# Patient Record
Sex: FEMALE | Race: WHITE | NOT HISPANIC OR LATINO | URBAN - METROPOLITAN AREA
[De-identification: names, ages, dates, MRNs, and addresses within clinical notes are randomized per-mention and may not be internally consistent; named-entity substitution may affect disease eponyms.]

---

## 2017-01-01 ENCOUNTER — HOME CARE/HOSPICE - HEALTHEAST (OUTPATIENT)
Dept: HOSPICE | Facility: HOSPICE | Age: 82
End: 2017-01-01

## 2017-01-01 ENCOUNTER — COMMUNICATION - HEALTHEAST (OUTPATIENT)
Dept: INTERNAL MEDICINE | Facility: CLINIC | Age: 82
End: 2017-01-01

## 2017-01-01 ENCOUNTER — COMMUNICATION - HEALTHEAST (OUTPATIENT)
Dept: ONCOLOGY | Facility: CLINIC | Age: 82
End: 2017-01-01

## 2017-01-01 ENCOUNTER — OFFICE VISIT - HEALTHEAST (OUTPATIENT)
Dept: INTERNAL MEDICINE | Facility: CLINIC | Age: 82
End: 2017-01-01

## 2017-01-01 ENCOUNTER — HOME CARE/HOSPICE - HEALTHEAST (OUTPATIENT)
Dept: HOME HEALTH SERVICES | Facility: HOME HEALTH | Age: 82
End: 2017-01-01

## 2017-01-01 ENCOUNTER — RECORDS - HEALTHEAST (OUTPATIENT)
Dept: ADMINISTRATIVE | Facility: OTHER | Age: 82
End: 2017-01-01

## 2017-01-01 ENCOUNTER — AMBULATORY - HEALTHEAST (OUTPATIENT)
Dept: GERIATRICS | Facility: CLINIC | Age: 82
End: 2017-01-01

## 2017-01-01 ENCOUNTER — HOSPITAL ENCOUNTER (OUTPATIENT)
Dept: ULTRASOUND IMAGING | Facility: CLINIC | Age: 82
Discharge: HOME | End: 2017-07-20
Attending: UROLOGY

## 2017-01-01 ENCOUNTER — AMBULATORY - HEALTHEAST (OUTPATIENT)
Dept: ONCOLOGY | Facility: CLINIC | Age: 82
End: 2017-01-01

## 2017-01-01 ENCOUNTER — OFFICE VISIT - HEALTHEAST (OUTPATIENT)
Dept: GERIATRICS | Facility: CLINIC | Age: 82
End: 2017-01-01

## 2017-01-01 ENCOUNTER — AMBULATORY - HEALTHEAST (OUTPATIENT)
Dept: HOSPICE | Facility: HOSPICE | Age: 82
End: 2017-01-01

## 2017-01-01 ENCOUNTER — HOSPITAL ENCOUNTER (OUTPATIENT)
Dept: RADIOLOGY | Facility: CLINIC | Age: 82
Discharge: HOME | End: 2017-11-17
Attending: SURGERY

## 2017-01-01 ENCOUNTER — COMMUNICATION - HEALTHEAST (OUTPATIENT)
Dept: HOME HEALTH SERVICES | Facility: HOME HEALTH | Age: 82
End: 2017-01-01

## 2017-01-01 ENCOUNTER — OFFICE VISIT - HEALTHEAST (OUTPATIENT)
Dept: CARDIOLOGY | Facility: CLINIC | Age: 82
End: 2017-01-01

## 2017-01-01 ENCOUNTER — AMBULATORY - HEALTHEAST (OUTPATIENT)
Dept: LAB | Facility: CLINIC | Age: 82
End: 2017-01-01

## 2017-01-01 ENCOUNTER — HOSPITAL ENCOUNTER (OUTPATIENT)
Dept: PET IMAGING | Facility: HOSPITAL | Age: 82
Discharge: HOME | End: 2017-11-03
Attending: INTERNAL MEDICINE

## 2017-01-01 ENCOUNTER — HOSPITAL ENCOUNTER (OUTPATIENT)
Dept: ULTRASOUND IMAGING | Facility: CLINIC | Age: 82
Discharge: HOME | End: 2017-08-28

## 2017-01-01 ENCOUNTER — COMMUNICATION - HEALTHEAST (OUTPATIENT)
Dept: SCHEDULING | Facility: CLINIC | Age: 82
End: 2017-01-01

## 2017-01-01 ENCOUNTER — COMMUNICATION - HEALTHEAST (OUTPATIENT)
Dept: ENDOCRINOLOGY | Facility: CLINIC | Age: 82
End: 2017-01-01

## 2017-01-01 ENCOUNTER — HOSPITAL ENCOUNTER (OUTPATIENT)
Dept: LAB | Age: 82
Setting detail: SPECIMEN
Discharge: HOME | End: 2017-04-20

## 2017-01-01 ENCOUNTER — OFFICE VISIT - HEALTHEAST (OUTPATIENT)
Dept: ONCOLOGY | Facility: CLINIC | Age: 82
End: 2017-01-01

## 2017-01-01 ENCOUNTER — RECORDS - HEALTHEAST (OUTPATIENT)
Dept: BONE DENSITY | Facility: CLINIC | Age: 82
End: 2017-01-01

## 2017-01-01 ENCOUNTER — COMMUNICATION - HEALTHEAST (OUTPATIENT)
Dept: PULMONOLOGY | Facility: OTHER | Age: 82
End: 2017-01-01

## 2017-01-01 ENCOUNTER — AMBULATORY - HEALTHEAST (OUTPATIENT)
Dept: INTENSIVE CARE | Facility: CLINIC | Age: 82
End: 2017-01-01

## 2017-01-01 DIAGNOSIS — J96.01 ACUTE RESPIRATORY FAILURE WITH HYPOXEMIA (H): ICD-10-CM

## 2017-01-01 DIAGNOSIS — C34.91 ADENOCARCINOMA OF LUNG, STAGE 4, RIGHT (H): ICD-10-CM

## 2017-01-01 DIAGNOSIS — J43.8 OTHER EMPHYSEMA (H): ICD-10-CM

## 2017-01-01 DIAGNOSIS — R33.9 URINARY RETENTION: ICD-10-CM

## 2017-01-01 DIAGNOSIS — R09.02 HYPOXIA: ICD-10-CM

## 2017-01-01 DIAGNOSIS — I05.9 MITRAL VALVE DISORDER: ICD-10-CM

## 2017-01-01 DIAGNOSIS — I50.32 CHRONIC DIASTOLIC HEART FAILURE (H): ICD-10-CM

## 2017-01-01 DIAGNOSIS — R91.1 LUNG NODULE: ICD-10-CM

## 2017-01-01 DIAGNOSIS — I31.39 EFFUSION, PERICARDIUM: ICD-10-CM

## 2017-01-01 DIAGNOSIS — M79.89 ARM SWELLING: ICD-10-CM

## 2017-01-01 DIAGNOSIS — I10 ESSENTIAL HYPERTENSION: ICD-10-CM

## 2017-01-01 DIAGNOSIS — K21.9 GERD (GASTROESOPHAGEAL REFLUX DISEASE): ICD-10-CM

## 2017-01-01 DIAGNOSIS — J90 PLEURAL EFFUSION: ICD-10-CM

## 2017-01-01 DIAGNOSIS — J91.0 MALIGNANT PLEURAL EFFUSION (H): ICD-10-CM

## 2017-01-01 DIAGNOSIS — M81.0 AGE-RELATED OSTEOPOROSIS WITHOUT CURRENT PATHOLOGICAL FRACTURE: ICD-10-CM

## 2017-01-01 DIAGNOSIS — I10 ESSENTIAL HYPERTENSION WITH GOAL BLOOD PRESSURE LESS THAN 140/90: ICD-10-CM

## 2017-01-01 DIAGNOSIS — I50.9 CHF (CONGESTIVE HEART FAILURE) (H): ICD-10-CM

## 2017-01-01 DIAGNOSIS — R35.0 FREQUENCY OF URINATION: ICD-10-CM

## 2017-01-01 DIAGNOSIS — I10 HTN (HYPERTENSION): ICD-10-CM

## 2017-01-01 DIAGNOSIS — R33.9 INCOMPLETE BLADDER EMPTYING: ICD-10-CM

## 2017-01-01 DIAGNOSIS — M81.0 SENILE OSTEOPOROSIS: ICD-10-CM

## 2017-01-01 DIAGNOSIS — I35.9 AORTIC VALVE DISORDER: ICD-10-CM

## 2017-01-01 DIAGNOSIS — R06.02 SHORTNESS OF BREATH: ICD-10-CM

## 2017-01-01 DIAGNOSIS — J43.9 EMPHYSEMA LUNG (H): ICD-10-CM

## 2017-01-01 DIAGNOSIS — G47.00 INSOMNIA: ICD-10-CM

## 2017-01-01 DIAGNOSIS — J91.0 PLEURAL EFFUSION, MALIGNANT (H): ICD-10-CM

## 2017-01-01 DIAGNOSIS — S80.811A ABRASION OF RIGHT LEG: ICD-10-CM

## 2017-01-01 DIAGNOSIS — R60.0 ARM EDEMA: ICD-10-CM

## 2017-01-01 DIAGNOSIS — T81.82XA: ICD-10-CM

## 2017-01-01 DIAGNOSIS — R30.0 DYSURIA: ICD-10-CM

## 2017-01-01 DIAGNOSIS — M25.471: ICD-10-CM

## 2017-01-01 DIAGNOSIS — Z23 NEED FOR PROPHYLACTIC VACCINATION AND INOCULATION AGAINST INFLUENZA: ICD-10-CM

## 2017-01-01 DIAGNOSIS — J44.9 CHRONIC OBSTRUCTIVE PULMONARY DISEASE, UNSPECIFIED COPD TYPE (H): ICD-10-CM

## 2017-01-01 DIAGNOSIS — I10 HYPERTENSION: ICD-10-CM

## 2017-01-01 DIAGNOSIS — R10.9 ABDOMINAL PAIN: ICD-10-CM

## 2017-01-01 DIAGNOSIS — R06.09 DYSPNEA ON EXERTION: ICD-10-CM

## 2017-01-01 DIAGNOSIS — I49.5 TACHYCARDIA-BRADYCARDIA SYNDROME (H): ICD-10-CM

## 2017-01-01 DIAGNOSIS — I10 ESSENTIAL HYPERTENSION WITH GOAL BLOOD PRESSURE LESS THAN 130/80: ICD-10-CM

## 2017-01-01 DIAGNOSIS — I89.0 LYMPHEDEMA: ICD-10-CM

## 2017-01-01 DIAGNOSIS — N39.0 RECURRENT UTI: ICD-10-CM

## 2017-01-01 LAB
ATRIAL RATE - MUSE: 82 BPM
DIASTOLIC BLOOD PRESSURE - MUSE: NORMAL MMHG
INTERPRETATION ECG - MUSE: NORMAL
P AXIS - MUSE: 82 DEGREES
PR INTERVAL - MUSE: 176 MS
QRS DURATION - MUSE: 84 MS
QT - MUSE: 376 MS
QTC - MUSE: 439 MS
R AXIS - MUSE: -49 DEGREES
SYSTOLIC BLOOD PRESSURE - MUSE: NORMAL MMHG
T AXIS - MUSE: 66 DEGREES
VENTRICULAR RATE- MUSE: 82 BPM

## 2017-01-01 RX ORDER — IPRATROPIUM BROMIDE AND ALBUTEROL SULFATE 2.5; .5 MG/3ML; MG/3ML
3 SOLUTION RESPIRATORY (INHALATION) 4 TIMES DAILY
Status: SHIPPED | COMMUNITY
Start: 2017-01-01

## 2017-01-01 RX ORDER — BISACODYL 10 MG
10 SUPPOSITORY, RECTAL RECTAL DAILY PRN
Status: SHIPPED | COMMUNITY
Start: 2017-01-01

## 2017-01-01 RX ORDER — HYDROMORPHONE HYDROCHLORIDE 1 MG/ML
1 SOLUTION ORAL 3 TIMES DAILY
Status: SHIPPED | COMMUNITY
Start: 2017-01-01

## 2017-01-01 RX ORDER — HYDROMORPHONE HYDROCHLORIDE 1 MG/ML
1 SOLUTION ORAL
Status: SHIPPED | COMMUNITY
Start: 2017-01-01

## 2017-01-01 RX ORDER — LORAZEPAM 0.5 MG/1
0.5-2 TABLET ORAL EVERY 12 HOURS PRN
Status: SHIPPED | COMMUNITY
Start: 2017-01-01

## 2017-01-01 RX ORDER — IPRATROPIUM BROMIDE AND ALBUTEROL SULFATE 2.5; .5 MG/3ML; MG/3ML
3 SOLUTION RESPIRATORY (INHALATION) EVERY 6 HOURS PRN
Qty: 25 VIAL | Refills: 2 | Status: SHIPPED | OUTPATIENT
Start: 2017-01-01 | End: 2018-10-13

## 2017-01-01 RX ORDER — FUROSEMIDE 20 MG
40 TABLET ORAL SEE ADMIN INSTRUCTIONS
Status: SHIPPED | COMMUNITY
Start: 2017-01-01 | End: 2018-01-01

## 2017-01-01 RX ORDER — ATROPINE SULFATE 10 MG/ML
1-2 SOLUTION/ DROPS OPHTHALMIC EVERY 4 HOURS PRN
Status: SHIPPED | COMMUNITY
Start: 2017-01-01

## 2017-01-01 RX ORDER — AMOXICILLIN 250 MG
1 CAPSULE ORAL 2 TIMES DAILY
Status: SHIPPED | COMMUNITY
Start: 2017-01-01

## 2017-01-01 RX ORDER — HALOPERIDOL 2 MG/ML
0.5-2 SOLUTION ORAL EVERY 4 HOURS PRN
Status: SHIPPED | COMMUNITY
Start: 2017-01-01

## 2017-01-01 RX ORDER — ALBUTEROL SULFATE 90 UG/1
1-2 AEROSOL, METERED RESPIRATORY (INHALATION) EVERY 4 HOURS PRN
Qty: 1 INHALER | Refills: 0 | Status: SHIPPED | OUTPATIENT
Start: 2017-01-01

## 2017-01-01 RX ORDER — PREDNISONE 10 MG/1
10 TABLET ORAL DAILY
Status: SHIPPED | COMMUNITY
Start: 2017-01-01

## 2017-01-01 ASSESSMENT — MIFFLIN-ST. JEOR
SCORE: 797.31
SCORE: 863.09
SCORE: 806.38

## 2021-05-30 ENCOUNTER — RECORDS - HEALTHEAST (OUTPATIENT)
Dept: ADMINISTRATIVE | Facility: CLINIC | Age: 86
End: 2021-05-30

## 2021-05-30 VITALS — BODY MASS INDEX: 19.72 KG/M2 | WEIGHT: 111.3 LBS

## 2021-05-30 VITALS — WEIGHT: 110 LBS | HEIGHT: 63 IN | BODY MASS INDEX: 19.49 KG/M2

## 2021-05-31 VITALS — WEIGHT: 103.7 LBS | BODY MASS INDEX: 18.37 KG/M2

## 2021-05-31 VITALS — BODY MASS INDEX: 18.6 KG/M2 | WEIGHT: 101.7 LBS

## 2021-05-31 VITALS — WEIGHT: 99 LBS | BODY MASS INDEX: 18.22 KG/M2 | HEIGHT: 62 IN

## 2021-05-31 VITALS — BODY MASS INDEX: 19.1 KG/M2 | WEIGHT: 107.8 LBS

## 2021-05-31 VITALS — WEIGHT: 99 LBS | BODY MASS INDEX: 18.11 KG/M2

## 2021-05-31 VITALS — BODY MASS INDEX: 18.11 KG/M2 | WEIGHT: 99 LBS

## 2021-05-31 VITALS — BODY MASS INDEX: 18.25 KG/M2 | WEIGHT: 103 LBS

## 2021-05-31 VITALS — WEIGHT: 107 LBS | BODY MASS INDEX: 18.95 KG/M2

## 2021-05-31 VITALS — WEIGHT: 106.7 LBS | BODY MASS INDEX: 19.52 KG/M2

## 2021-05-31 VITALS — BODY MASS INDEX: 18.58 KG/M2 | WEIGHT: 101 LBS | HEIGHT: 62 IN

## 2021-05-31 VITALS — BODY MASS INDEX: 18.66 KG/M2 | WEIGHT: 102 LBS

## 2021-06-01 ENCOUNTER — RECORDS - HEALTHEAST (OUTPATIENT)
Dept: ADMINISTRATIVE | Facility: CLINIC | Age: 86
End: 2021-06-01

## 2021-06-05 ENCOUNTER — RECORDS - HEALTHEAST (OUTPATIENT)
Dept: INTERVENTIONAL RADIOLOGY/VASCULAR | Facility: HOSPITAL | Age: 86
End: 2021-06-05

## 2021-06-05 ENCOUNTER — RECORDS - HEALTHEAST (OUTPATIENT)
Dept: SCHEDULING | Facility: CLINIC | Age: 86
End: 2021-06-05

## 2021-06-05 DIAGNOSIS — M81.0 OSTEOPOROSIS: ICD-10-CM

## 2021-06-05 DIAGNOSIS — M54.50 LOW BACK PAIN: ICD-10-CM

## 2021-06-05 DIAGNOSIS — M51.9: ICD-10-CM

## 2021-06-05 DIAGNOSIS — T14.8XXA CLOSED FRACTURE OF BONE: ICD-10-CM

## 2021-06-09 NOTE — PROGRESS NOTES
1. Osteoporosis Senile  DXA Bone Density Scan       Return in about 6 months (around 8/24/2017) for Recheck.    Patient Instructions   Prolia 2nd today.  Prolia 3rd in 6 months.  DXA in August 2017.   Phone number to schedule [phone].  Please avoid any extensive dental work as implants and teeth extractions for the next 1-2 months.  Daily calcium need is 5928-7282 mg a day from the diet and supplements.  Calcium citrate is easier to digest.  Vitamin D 2000 IU daily recommended.      Chief Complaint   Patient presents with     Osteoporosis Follow Up     Joint Swelling       Visit Vitals     /70     Pulse 66     Wt 111 lb 4.8 oz (50.5 kg)     BMI 19.72 kg/m2         Did you experience any problems with previous Prolia injection? no  Any medication change in the last 6 months? no  Did you take prednisone or other immunosupressant drugs in the last 6 months   (chemo, transplant, rheum, dermatology conditions)? no  Did you have any serious infection in the last 6 months?no  Any recent hospitalizations?no  Do you plan any dental work in the next 2-3 months?no  How much calcium do you take daily from the diet and supplements?1200 mg  How much vit D do you take daily? 2000 IU  Last DXA? 8/2016      Patient is here today for the 2nd Prolia injection. Patient tolerated previous injections well.   We discussed calcium and vit D daily needs today.   Next Prolia injection will be in 6 months.     15 minutes spent with the patient and more then 50 % of the time in counseling.  This note has been dictated using voice recognition software. Any grammatical or context distortions are unintentional and inherent to the software      Patient Active Problem List   Diagnosis     Aortic Regurgitation     Diastolic Dysfunction     Hyperlipidemia     Mitral Regurgitation     Emphysema     Hypertension     Osteoporosis Senile     GERD (gastroesophageal reflux disease)     CHF (congestive heart failure)       Current Outpatient  Prescriptions on File Prior to Visit   Medication Sig Dispense Refill     acetaminophen (TYLENOL) 325 MG tablet Take 650 mg by mouth every 6 (six) hours as needed for pain.       albuterol (PROAIR HFA) 90 mcg/actuation inhaler Inhale 1-2 puffs every 4 (four) hours as needed. Every 4-6 hours 1 Inhaler 6     aspirin 325 MG EC tablet Take 325 mg by mouth daily as needed.        bisacodyl (DULCOLAX) 10 mg suppository Insert 10 mg into the rectum daily as needed.       calcium carbonate (OS-NICKIE) 600 mg (1,500 mg) tablet Take 600 mg by mouth 2 (two) times a day with meals.       CARTIA  mg 24 hr capsule TAKE 1 CAPSULE DAILY 90 capsule 1     cholecalciferol, vitamin D3, 1,000 unit tablet Take 2,000 Units by mouth daily.       diltiazem (CARTIA XT) 240 MG 24 hr capsule Take 240 mg by mouth every evening.        fluticasone-salmeterol (ADVAIR DISKUS) 500-50 mcg/dose DISKUS USE 1 INHALATION TWO TIMES A DAY 3 each 2     furosemide (LASIX) 20 MG tablet TAKE 1 TABLET DAILY 90 tablet 3     ibuprofen (ADVIL,MOTRIN) 200 MG tablet Take 200 mg by mouth every 6 (six) hours as needed for pain.       ipratropium-albuterol (DUO-NEB) 0.5-2.5 mg/3 mL nebulizer Take 3 mL by nebulization every 6 (six) hours as needed. 25 vial 2     losartan (COZAAR) 100 MG tablet Take 1 tablet (100 mg total) by mouth every evening. 90 tablet 3     magnesium oxide (MAGOX) 400 mg tablet Take 1 tablet (400 mg total) by mouth daily. 200 tablet 1     melatonin 3 mg Tab tablet Take 3 mg by mouth bedtime as needed.       multivitamin (MULTIVITAMIN) per tablet Take 1 tablet by mouth daily.       nebulizer and compressor Blanca duonebs 1 each 0     ranitidine (ZANTAC) 150 MG tablet Take 1 tablet (150 mg total) by mouth 2 (two) times a day. 60 tablet 1     senna-docusate (PERICOLACE) 8.6-50 mg tablet Take 1 tablet by mouth bedtime as needed for constipation.       VIT A/VIT C/VIT E/ZINC/COPPER (ICAPS AREDS ORAL) Take 2 capsules by mouth daily.        cloNIDine HCl  (CATAPRES) 0.2 MG tablet Take 1 tablet (0.2 mg total) by mouth daily. 90 tablet 3     No current facility-administered medications on file prior to visit.

## 2021-06-10 NOTE — PROGRESS NOTES
"ASSESSMENT:    Cystitis    PLAN:  Urine culture will be run.  Cipro 500 twice daily for 10 days.  Follow-up as needed.  Problem List Items Addressed This Visit     None      Visit Diagnoses     Frequency of urination    -  Primary    Relevant Orders    Urinalysis-UC if Indicated          There are no discontinued medications.    No Follow-up on file.    There are no Patient Instructions on file for this visit.    CHIEF COMPLAINT:  Chief Complaint   Patient presents with     Urinary Tract Infection     possible UTI  - cloudy urine, abdominal pain, frequency at night time.        HISTORY OF PRESENT ILLNESS:  Susu Pinedo is a 89 y.o. female presenting to the clinic today with a possible UTI. Her present symptoms include increased urgency and frequency of urination at night, lower abdominal pain, and cloudy urine. She clarifies that she wakes for nocturia with high urgency frequently throughout the night but is unable to void significant amounts of urine each time, some times not passing any urine whatsoever. She denies experiencing increased frequency or urgency of urination during the day. Her urinalysis is positive for a small amount of leukocytes and a moderate amount of bacteria.     REVIEW OF SYSTEMS:   She denies fevers or hematuria. She is feeling well physically outside of her present urinary symptoms. All other systems are negative.    PFSH:  Allergies   Allergen Reactions     Amlodipine Besylate      Hydrochlorothiazide      Annotation: caused low sodium       Other Allergy (See Comments)      Contrast Media Ready-Box MISC, 04/10/2012.       Penicillins Hives       TOBACCO USE:  History   Smoking Status     Former Smoker     Quit date: 7/11/1965   Smokeless Tobacco     Not on file       VITALS:  Vitals:    04/20/17 0935 04/20/17 0946   BP: 170/66 154/64   Pulse: 72    Weight: 110 lb (49.9 kg)    Height: 5' 3\" (1.6 m)      Wt Readings from Last 3 Encounters:   04/20/17 110 lb (49.9 kg)   02/24/17 111 lb 4.8 " oz (50.5 kg)   09/28/16 107 lb 12.8 oz (48.9 kg)         PHYSICAL EXAM:  Constitutional:  Reveals an alert and oriented x3, pleasant female with no acute distress.       ADDITIONAL HISTORY SUMMARIZED (FROM OLD RECORDS OR HISTORY FROM SOMEONE OTHER THAN THE PATIENT OR ANOTHER HEALTHCARE PROVIDER), COLONOSCOPY (2 TOTAL): none    DECISION TO OBTAIN EXTRA INFORMATION (OLD RECORDS REQUESTED OR HISTORY FROM ANOTHER PERSON OR ACCESSING CARE EVERYWHERE) (1 TOTAL):none    RADIOLOGY TESTS SUMMARIZED OR ORDERED (XRAY/CT/MRI/DXA/MAMMO) (1 TOTAL): none    LABS REVIEWED OR ORDERED (1 TOTAL): Urinalysis ordered and reviewed.    MEDICINE TESTS SUMMARIZED OR ORDERED (EKG/ECHO/EGD) (1 TOTAL): none    INDEPENDENT REVIEW OF EKG OR X-RAY (2 EACH): none      The visit lasted a total of 5 minutes face to face with the patient. Over 50% of the time was spent counseling and educating the patient about UTI.    IBrandyn, am scribing for and in the presence of, Dr. Mati Alfaro.    I, Dr. Mati Alfaro, personally performed the services described in this documentation, as scribed by Brandyn Christine in my presence, and it is both accurate and complete.    MEDICATIONS:  Current Outpatient Prescriptions   Medication Sig Dispense Refill     albuterol (PROAIR HFA) 90 mcg/actuation inhaler Inhale 1-2 puffs every 4 (four) hours as needed. Every 4-6 hours 1 Inhaler 0     aspirin 325 MG EC tablet Take 325 mg by mouth daily as needed.        calcium carbonate (OS-NICKIE) 600 mg (1,500 mg) tablet Take 600 mg by mouth 2 (two) times a day with meals.       cholecalciferol, vitamin D3, 1,000 unit tablet Take 2,000 Units by mouth daily.       cloNIDine HCl (CATAPRES) 0.2 MG tablet TAKE 1 TABLET DAILY 90 tablet 2     diltiazem (CARTIA XT) 240 MG 24 hr capsule Take 240 mg by mouth every evening.        fluticasone-salmeterol (ADVAIR DISKUS) 500-50 mcg/dose DISKUS USE 1 INHALATION TWO TIMES A DAY 3 each 2     furosemide (LASIX) 20 MG tablet TAKE 1 TABLET DAILY  90 tablet 3     ibuprofen (ADVIL,MOTRIN) 200 MG tablet Take 200 mg by mouth every 6 (six) hours as needed for pain.       ipratropium-albuterol (DUO-NEB) 0.5-2.5 mg/3 mL nebulizer Take 3 mL by nebulization every 6 (six) hours as needed. 25 vial 2     losartan (COZAAR) 100 MG tablet Take 1 tablet (100 mg total) by mouth every evening. 90 tablet 3     magnesium oxide (MAGOX) 400 mg tablet Take 1 tablet (400 mg total) by mouth daily. 200 tablet 1     melatonin 3 mg Tab tablet Take 3 mg by mouth bedtime as needed.       multivitamin (MULTIVITAMIN) per tablet Take 1 tablet by mouth daily.       nebulizer and compressor Blanca duonebs 1 each 0     VIT A/VIT C/VIT E/ZINC/COPPER (ICAPS AREDS ORAL) Take 2 capsules by mouth daily.        acetaminophen (TYLENOL) 325 MG tablet Take 650 mg by mouth every 6 (six) hours as needed for pain.       bisacodyl (DULCOLAX) 10 mg suppository Insert 10 mg into the rectum daily as needed.       CARTIA  mg 24 hr capsule TAKE 1 CAPSULE DAILY 90 capsule 1     senna-docusate (PERICOLACE) 8.6-50 mg tablet Take 1 tablet by mouth bedtime as needed for constipation.       No current facility-administered medications for this visit.        Total data points: 1

## 2021-06-11 NOTE — PROGRESS NOTES
Assessment/Plan:        1. Dysuria  Urinalysis-UC if Indicated    Culture, Urine   2. Urinary retention  Ambulatory referral to Urology   3. GERD (gastroesophageal reflux disease)     4. Recurrent UTI  Ambulatory referral to Urology     #1 recurrent UTI, urinalysis today does not show significant sign of infection and will wait for urine culture.  I was more concerned about urinary retention and her inability to produce urine and I would like her to see urologist.  2.  We will restart the omeprazole that she was taking in the past for the GERD symptoms  3.  Osteoporosis, she will be here for next Prolia injection at the end of August.    This note has been dictated using voice recognition software. Any grammatical or context distortions are unintentional and inherent to the software.            Subjective:    Susu Pinedo is a 89 y.o. female  here for    Chief Complaint   Patient presents with     Urinary Tract Infection     stomach pain-not during urination      Patient is here today with concern that she has may be UTI.  She was diagnosed with UTI twice in the last 3 months.  She had UTI in April when she was treated with Cipro and then she had UTI in May when she was treated with Keflex.  Now she is experiencing again some difficulty with urination, not so much pain or blood in the urine but mostly difficulty starting urinary stream.  She is also having some generalized abdominal discomfort on and off over the last 2 weeks.  She denies any fever, chills, back pain, nausea, vomiting, weight loss.  She does have a history of GERD and mild dyspepsia and occasional discomfort in the epigastric area.    She has hypertension which is very well managed with her medications.  She has osteoporosis which she is due for Prolia at the end of August.      Social History     Social History     Marital status:      Spouse name: N/A     Number of children: N/A     Years of education: N/A     Occupational History     Not  on file.     Social History Main Topics     Smoking status: Former Smoker     Quit date: 7/11/1965     Smokeless tobacco: Not on file     Alcohol use 0.6 oz/week     1 Cans of beer per week      Comment: 2 gin and tonics/day     Drug use: No     Sexual activity: Not on file     Other Topics Concern     Not on file     Social History Narrative           No family history on file.  Review of Systems:     A 12 point comprehensive review of systems was negative except as noted in HPI.            Objective:    Physical Exam   /70  Pulse 70  Wt 107 lb 12.8 oz (48.9 kg)  BMI 19.1 kg/m2    Constitutional: oriented to person, place, and time, appears well-nourished. No distress.   HENT:   Head: Normocephalic.   Mouth/Throat: Oropharynx is clear and moist.   Neck: Normal range of motion. Neck supple.   Cardiovascular: Normal rate, regular rhythm and normal heart sounds.    Pulmonary/Chest: Effort normal and breath sounds normal.  Abdominal: Soft. Bowel sounds are normal. No CVA tenderness and pain above the bladder. Slight discomfort in epigastric area.  Musculoskeletal: Normal range of motion.   Neurological: alert and oriented to person, place, and time.  Psychiatric:  normal mood and affect.    Patient Active Problem List   Diagnosis     Aortic Regurgitation     Diastolic Dysfunction     Hyperlipidemia     Mitral Regurgitation     Emphysema     Hypertension     Osteoporosis Senile     GERD (gastroesophageal reflux disease)     CHF (congestive heart failure)       Current Outpatient Prescriptions on File Prior to Visit   Medication Sig Dispense Refill     acetaminophen (TYLENOL) 325 MG tablet Take 650 mg by mouth every 6 (six) hours as needed for pain.       ADVAIR DISKUS 500-50 mcg/dose DISKUS USE 1 INHALATION TWICE A  each 0     albuterol (PROAIR HFA) 90 mcg/actuation inhaler Inhale 1-2 puffs every 4 (four) hours as needed. Every 4-6 hours 1 Inhaler 0     aspirin 325 MG EC tablet Take 325 mg by  mouth daily as needed.        bisacodyl (DULCOLAX) 10 mg suppository Insert 10 mg into the rectum daily as needed.       calcium carbonate (OS-NICKIE) 600 mg (1,500 mg) tablet Take 600 mg by mouth 2 (two) times a day with meals.       CARTIA  mg 24 hr capsule TAKE 1 CAPSULE DAILY 90 capsule 1     cholecalciferol, vitamin D3, 1,000 unit tablet Take 2,000 Units by mouth daily.       cloNIDine HCl (CATAPRES) 0.2 MG tablet TAKE 1 TABLET DAILY 90 tablet 2     diltiazem (CARTIA XT) 240 MG 24 hr capsule Take 240 mg by mouth every evening.        furosemide (LASIX) 20 MG tablet TAKE 1 TABLET DAILY 90 tablet 3     ibuprofen (ADVIL,MOTRIN) 200 MG tablet Take 200 mg by mouth every 6 (six) hours as needed for pain.       ipratropium-albuterol (DUO-NEB) 0.5-2.5 mg/3 mL nebulizer Take 3 mL by nebulization every 6 (six) hours as needed. 25 vial 2     losartan (COZAAR) 100 MG tablet TAKE 1 TABLET EVERY EVENING 90 tablet 2     magnesium oxide (MAGOX) 400 mg tablet Take 1 tablet (400 mg total) by mouth daily. 200 tablet 1     melatonin 3 mg Tab tablet Take 3 mg by mouth bedtime as needed.       multivitamin (MULTIVITAMIN) per tablet Take 1 tablet by mouth daily.       nebulizer and compressor Blanca duonebs 1 each 0     senna-docusate (PERICOLACE) 8.6-50 mg tablet Take 1 tablet by mouth bedtime as needed for constipation.       VIT A/VIT C/VIT E/ZINC/COPPER (ICAPS AREDS ORAL) Take 2 capsules by mouth daily.        [DISCONTINUED] ipratropium-albuterol (DUO-NEB) 0.5-2.5 mg/3 mL nebulizer NEBULIZE ONE VIAL EVERY 6 HOURS AS NEEDED 90 mL 0     No current facility-administered medications on file prior to visit.                Raúl Espitia  6/29/2017

## 2021-06-12 NOTE — PROGRESS NOTES
Clinic Note    Assessment:     Assessment and Plan:    1. Arm swelling    -We need to determine if this is a DVT vs. Lymphedema vs. Positional in nature (due to her sleeping on it, etc.). We will draw a d-dimer today, and have her scheduled for an US in the next 24 hours. See instructions below.     - US Venous Arm Right; Future  - HC FDP,D DIMER;QUAL, SO     Patient Instructions   -We will call and schedule an Ultrasound of your arm for sometime in the next 24 hours.     -We will check a blood test today to see if there is a blood clot in your arm.     -Try to keep that arm elevated when you can, and try not to sleep on it at night.    -Schedule a follow-up appointment with James Aguilera CNP- when Dr. Espitia is also in the office seeing other patients.      Return in about 3 days (around 8/31/2017).         Subjective:      Susu Pinedo is a 89 y.o. female who comes to the clinic with right arm swelling.     Swelling started two weeks ago.   Swelling seems to fluctuate in severity, it is swollen today, but patient says that it has been worse than this.   Patient has not tried anything to alleviate the swelling.   Swelling seems to be only in her right forearm.  Arm does not hurt unless she presses on it.   Pt does not recall falling or injuring her harm  Denies any discoloration or bruising.   Pt says that she has been having increased shortness of breath lately; nebulizer helps with this. No coughing. No chest pain.   Pt has Hx of bilateral mastectomies due to breast cancer, says that she had some lymphedema on her right arm, but that was back in 1971.             The following portions of the patient's history were reviewed and updated as appropriate: Medications, problem list, and allergies.     Review of Systems:    Review is negative except for what is mentioned above.     Social Hx:    History   Smoking Status     Former Smoker     Quit date: 7/11/1965   Smokeless Tobacco     Not on file         Objective:      Vitals:    08/28/17 1132   BP: 168/70   Patient Site: Right Arm   Patient Position: Sitting   Cuff Size: Adult Regular   Pulse: 72   Temp: 98.7  F (37.1  C)   TempSrc: Oral       Exam:    General: No apparent distress. Calm. Alert and Oriented X3. Pt behavior is appropriate.  Lymph: No axillar or cervical adenopathy. No axillar abnormalities  Chest/Lungs: Normal chest wall, clear to auscultation, normal respiratory effort and rate.   Heart/Pulses:Mild MR, strong and equal radial pulses, no murmurs. Capillary refill <2 seconds. No edema.   Abdomen: Soft, no palpable masses. No hepatosplenomegaly, no tenderness with palpation noted. Bowel sounds active in all quadrants. No increased tympany.   Musculoskeletal: Swelling present in right forearm from the elbow down to fingers. Good strength and ROM. No redness, discoloration, or bruising noted. Good brachial/radial pulses. No abnormal sensation or numbness.  Neurologic: Interactive, alert, no focal findings, CNs intact.   Skin: Warm, dry. Normal hair pattern. Free of lesions. Normal skin turgor.       Patient Active Problem List   Diagnosis     Aortic Regurgitation     Diastolic Dysfunction     Hyperlipidemia     Mitral Regurgitation     Emphysema     Hypertension     Osteoporosis Senile     GERD (gastroesophageal reflux disease)     CHF (congestive heart failure)     Current Outpatient Prescriptions   Medication Sig Dispense Refill     acetaminophen (TYLENOL) 325 MG tablet Take 650 mg by mouth every 6 (six) hours as needed for pain.       ADVAIR DISKUS 500-50 mcg/dose DISKUS USE 1 INHALATION TWICE A  each 3     albuterol (PROAIR HFA) 90 mcg/actuation inhaler Inhale 1-2 puffs every 4 (four) hours as needed. Every 4-6 hours 1 Inhaler 0     aspirin 325 MG EC tablet Take 325 mg by mouth daily as needed.        bisacodyl (DULCOLAX) 10 mg suppository Insert 10 mg into the rectum daily as needed.       calcium carbonate (OS-NICKIE) 600 mg (1,500 mg) tablet Take 600 mg  by mouth 2 (two) times a day with meals.       CARTIA  mg 24 hr capsule TAKE 1 CAPSULE DAILY 90 capsule 1     cholecalciferol, vitamin D3, 1,000 unit tablet Take 2,000 Units by mouth daily.       cloNIDine HCl (CATAPRES) 0.2 MG tablet TAKE 1 TABLET DAILY 90 tablet 2     diltiazem (CARTIA XT) 240 MG 24 hr capsule Take 240 mg by mouth every evening.        doxycycline (MONODOX) 100 MG capsule Take 1 capsule (100 mg total) by mouth 2 (two) times a day. 20 capsule 0     furosemide (LASIX) 20 MG tablet TAKE 1 TABLET DAILY 90 tablet 3     ibuprofen (ADVIL,MOTRIN) 200 MG tablet Take 200 mg by mouth every 6 (six) hours as needed for pain.       ipratropium-albuterol (DUO-NEB) 0.5-2.5 mg/3 mL nebulizer Take 3 mL by nebulization every 6 (six) hours as needed. 25 vial 2     losartan (COZAAR) 100 MG tablet TAKE 1 TABLET EVERY EVENING 90 tablet 2     magnesium oxide (MAGOX) 400 mg tablet Take 1 tablet (400 mg total) by mouth daily. 200 tablet 1     melatonin 3 mg Tab tablet Take 3 mg by mouth bedtime as needed.       multivitamin (MULTIVITAMIN) per tablet Take 1 tablet by mouth daily.       nebulizer and compressor Blanca duonebs 1 each 0     omeprazole (PRILOSEC) 20 MG capsule Take 1 capsule (20 mg total) by mouth daily. 90 capsule 1     senna-docusate (PERICOLACE) 8.6-50 mg tablet Take 1 tablet by mouth bedtime as needed for constipation.       VIT A/VIT C/VIT E/ZINC/COPPER (ICAPS AREDS ORAL) Take 2 capsules by mouth daily.        No current facility-administered medications for this visit.          Pee Aguilera CNP (Rob)    8/28/2017         The patient was discussed with nurse nurse practitioner and then evaluated by me showing this right forearm edematous change and I agree with the plan for workup including ultrasound heat elevation.  Further recommendations will need to be made pending those results.  Ryan Sage

## 2021-06-12 NOTE — PROGRESS NOTES
Assessment/Plan:        1. Osteoporosis Senile     2. GERD (gastroesophageal reflux disease)     3. Lymphedema     4. Emphysema     5. Abrasion of right leg  Ambulatory referral to Home Health   6. Essential hypertension with goal blood pressure less than 140/90       #1 osteoporosis, Prolia will be postponed until she finished with oral surgery  2.  Hypertension is well managed  3.  I ordered a home health care and nurse help for the wound care at home.  4.  Emphysema stable, she will continue with inhalers  5. Right arm lymphedema and Superficial thrombus right cephalic vein, she will continue with compression and elevation.    This note has been dictated using voice recognition software. Any grammatical or context distortions are unintentional and inherent to the software.      Return in about 6 months (around 3/6/2018) for Recheck.    Patient Instructions   Prolia 3rd when finish the dental work.  Prolia 4th in 6 months with my nurse.  DXA in 2 years .   Phone number to schedule [phone].  Please avoid any extensive dental work as implants and teeth extractions for the next 1-2 months.  Daily calcium need is 4691-1363 mg a day from the diet and supplements.  Calcium citrate is easier to digest.  Vitamin D 2000 IU daily recommended.    Flu shot in a few weeks.          Subjective:    Susu Pinedo is a 89 y.o. female  here for    Chief Complaint   Patient presents with     Osteoporosis Follow Up     Patient is here today for the follow-up of her chronic medical problems.  Her granddaughter is here today with her and they have a list of questions.  1.  We discussed osteoporosis and since she has some oral surgery planned for next week, we will need to postpone Prolia.  We did review the DEXA scan which showed stable bone density compared to last year.  She is tolerating Prolia well.  2.  Patient was diagnosed with superficial vein thrombosis in her right arm few weeks ago, ultrasound did not show any DVT.  She  does have a history of breast cancer and bilateral mastectomies many years ago and lymphedema.  She does use elastic wrap and she did order arm sleeve.  Taking that the swelling is significantly better.  She has less pain.  3.  She has hypertension which is well managed with her medications.  4.  She has emphysema and she is using Advair daily and albuterol as needed.  Her granddaughter asked her to start using IS which she found helpful.  She does have occasional wheezing.  She has a baseline dyspnea on exertion for many years which she is not changed at this point.  5.  She scratched her right lower leg and has skin abrasion that is covered with dressing at her granddaughter did.  They will need some help at home for the dressing changes since her granddaughter is going back to [place] tomorrow.    Social History     Social History     Marital status:      Spouse name: N/A     Number of children: N/A     Years of education: N/A     Occupational History     Not on file.     Social History Main Topics     Smoking status: Former Smoker     Quit date: 7/11/1965     Smokeless tobacco: Not on file     Alcohol use 0.6 oz/week     1 Cans of beer per week      Comment: 2 gin and tonics/day     Drug use: No     Sexual activity: Not on file     Other Topics Concern     Not on file     Social History Narrative           No family history on file.  Review of Systems:     A 12 point comprehensive review of systems was negative except as noted in HPI.            Objective:    Physical Exam   /80  Pulse 80  Wt 103 lb (46.7 kg)  BMI 18.25 kg/m2    Constitutional: oriented to person, place, and time, appears well-nourished. No distress.   HENT:   Head: Normocephalic.   Mouth/Throat: Oropharynx is clear and moist.   Eyes: Conjunctivae are normal.  Neck: Normal range of motion. Neck supple.   Cardiovascular: Normal rate, regular rhythm and normal heart sounds.    Pulmonary/Chest: Effort normal and breath sounds  normal., no crackles or wheezing.  Abdominal: Soft.   Musculoskeletal: Normal range of motion. Right arm slighlty swollen, no redness, looks like lymphedema.  Skin: 2-3cm long skin abrasion on the posterior right calf.  Neurological: alert and oriented to person, place, and time.  Psychiatric:  normal mood and affect.    Patient Active Problem List   Diagnosis     Aortic Regurgitation     Diastolic Dysfunction     Hyperlipidemia     Mitral Regurgitation     Emphysema     Osteoporosis Senile     GERD (gastroesophageal reflux disease)     CHF (congestive heart failure)     Lymphedema     Essential hypertension       Current Outpatient Prescriptions on File Prior to Visit   Medication Sig Dispense Refill     acetaminophen (TYLENOL) 325 MG tablet Take 650 mg by mouth every 6 (six) hours as needed for pain.       ADVAIR DISKUS 500-50 mcg/dose DISKUS USE 1 INHALATION TWICE A  each 3     albuterol (PROAIR HFA) 90 mcg/actuation inhaler Inhale 1-2 puffs every 4 (four) hours as needed. Every 4-6 hours 1 Inhaler 0     aspirin 325 MG EC tablet Take 325 mg by mouth daily as needed.        bisacodyl (DULCOLAX) 10 mg suppository Insert 10 mg into the rectum daily as needed.       calcium carbonate (OS-NICKIE) 600 mg (1,500 mg) tablet Take 600 mg by mouth 2 (two) times a day with meals.       cholecalciferol, vitamin D3, 1,000 unit tablet Take 2,000 Units by mouth daily.       cloNIDine HCl (CATAPRES) 0.2 MG tablet TAKE 1 TABLET DAILY 90 tablet 2     diltiazem (CARTIA XT) 240 MG 24 hr capsule Take 240 mg by mouth every evening.        furosemide (LASIX) 20 MG tablet TAKE 1 TABLET DAILY 90 tablet 3     ibuprofen (ADVIL,MOTRIN) 200 MG tablet Take 200 mg by mouth every 6 (six) hours as needed for pain.       ipratropium-albuterol (DUO-NEB) 0.5-2.5 mg/3 mL nebulizer Take 3 mL by nebulization every 6 (six) hours as needed. 25 vial 2     losartan (COZAAR) 100 MG tablet TAKE 1 TABLET EVERY EVENING 90 tablet 2     magnesium oxide  (MAGOX) 400 mg tablet Take 1 tablet (400 mg total) by mouth daily. 200 tablet 1     melatonin 3 mg Tab tablet Take 3 mg by mouth bedtime as needed.       multivitamin (MULTIVITAMIN) per tablet Take 1 tablet by mouth daily.       nebulizer and compressor Blanca duonebs 1 each 0     omeprazole (PRILOSEC) 20 MG capsule Take 1 capsule (20 mg total) by mouth daily. 90 capsule 1     senna-docusate (PERICOLACE) 8.6-50 mg tablet Take 1 tablet by mouth bedtime as needed for constipation.       VIT A/VIT C/VIT E/ZINC/COPPER (ICAPS AREDS ORAL) Take 2 capsules by mouth daily.        [DISCONTINUED] CARTIA  mg 24 hr capsule TAKE 1 CAPSULE DAILY 90 capsule 1     [DISCONTINUED] doxycycline (MONODOX) 100 MG capsule Take 1 capsule (100 mg total) by mouth 2 (two) times a day. 20 capsule 0     No current facility-administered medications on file prior to visit.                Raúl Espitia  9/6/2017

## 2021-06-13 NOTE — PROGRESS NOTES
[facility]      NAME:  Susu Pinedo             :  1925    MRN: [id number]    CODE STATUS:  DNR    FACILITY: [facility] [[id number]]         CHIEF COMPLAIN/REASON FOR VISIT:  Chief Complaint   Patient presents with     Review Of Multiple Medical Conditions       HISTORY OF PRESENT ILLNESS: Susu Pinedo is a 89 y.o. female being seen today with her granddaughter who lives in [place] at bedside.  Susu has recently been diagnosed with   Adenocarcinoma of lung, stage 4, right sided.  This was discovered after a workup when she went into [facility] with an increased shortness of breath.  She had been living at a senior living apartment and was quite ambulatory with a walker.  Past medical history also includes: Aortic Regurgitation, Chronic obstructive pulmonary disease, unspecified COPD type   Gastroesophageal reflux disease without esophagitis   Essential hypertension, Chronic diastolic heart failure  and HTN. She is seen in her room with oxygen in place sitting in a chair. Does get SOB with conversations.          Allergies   Allergen Reactions     Amlodipine Besylate Hives     Hydrochlorothiazide      Annotation: caused low sodium       Other Allergy (See Comments) Hives     Contrast Media Ready-Box MISC, 04/10/2012.       Penicillins Hives   :     Current Outpatient Prescriptions   Medication Sig     acetaminophen (TYLENOL) 650 MG suppository Insert 1 suppository (650 mg total) into the rectum every 6 (six) hours as needed.     albuterol (PROAIR HFA) 90 mcg/actuation inhaler Inhale 1-2 puffs every 4 (four) hours as needed. Every 4-6 hours     aspirin 325 MG EC tablet Take 325 mg by mouth every 7 days.      calcium carbonate (OS-NICKIE) 600 mg (1,500 mg) tablet Take 600 mg by mouth 2 (two) times a day with meals.     cholecalciferol, vitamin D3, 1,000 unit tablet Take 2,000 Units by mouth 2 (two) times a day.      cloNIDine HCl (CATAPRES) 0.2 MG tablet Take 0.2 mg by  mouth daily.     diltiazem (CARTIA XT) 240 MG 24 hr capsule Take 240 mg by mouth every evening.      fluticasone-salmeterol (ADVAIR) 500-50 mcg/dose DISKUS Inhale 1 puff 2 (two) times a day.     furosemide (LASIX) 20 MG tablet Take 20 mg by mouth daily.     ibuprofen (ADVIL,MOTRIN) 200 MG tablet Take 200 mg by mouth every 6 (six) hours as needed for pain.     ipratropium-albuterol (DUO-NEB) 0.5-2.5 mg/3 mL nebulizer Take 3 mL by nebulization every 6 (six) hours as needed.     lidocaine 4 % patch Remove and discard patch with 12 hours or as directed by MD.     losartan (COZAAR) 100 MG tablet Take 100 mg by mouth every evening.     magnesium oxide (MAG-OX) 400 mg tablet Take 400 mg by mouth daily.     melatonin 3 mg Tab tablet Take 6 mg by mouth at bedtime as needed.      multivitamin (MULTIVITAMIN) per tablet Take 1 tablet by mouth daily.     nebulizer and compressor Blanca duonebs     VIT A/VIT C/VIT E/ZINC/COPPER (ICAPS AREDS ORAL) Take 2 capsules by mouth daily.          REVIEW OF SYSTEMS:    Currently, no fever, chills, or rigors. Does not have any visual or hearing problems. Denies any chest pain, headaches, palpitations, lightheadedness, dizziness, increase in  shortness of breath, or cough. Appetite is good. Denies any GERD symptoms. Denies any difficulty with swallowing, nausea, or vomiting.  Denies any abdominal pain, diarrhea or constipation. Denies any urinary symptoms. No insomnia. No active bleeding. No rash.       PHYSICAL EXAMINATION:  Vitals:    11/06/17 1134   BP: 151/83   Pulse: 72   Temp: 98.4  F (36.9  C)   Weight: (!) 99 lb (44.9 kg)         GENERAL: Awake, Alert, oriented x3, not in any form of acute distress, answers questions appropriately, follows simple commands, conversant  HEENT: Head is normocephalic with normal hair distribution. No evidence of trauma. Ears: No acute purulent discharge. Eyes: Conjunctivae pink with no scleral jaundice. Nose: Normal mucosa and septum. NECK: Supple with no  cervical or supraclavicular lymphadenopathy. Trachea is midline.   CHEST: No tenderness or deformity, no crepitus  LUNG: DM to auscultation  On R with good chest expansion. There are right sided  crackles throughout, normal AP diameter.  BACK: No kyphosis of the thoracic spine. Symmetric, no curvature, ROM normal, no CVA tenderness, no spinal tenderness   CVS: There is good S1  S2, there are no murmurs, rubs, gallops, or heaves, rhythm is regular.  ABDOMEN: Globular and soft, nontender to palpation, non distended, no masses, no organomegaly, good bowel sounds, no rebound or guarding, no peritoneal signs.   EXTREMITIES: Atraumatic. Full range of motion on both upper and lower extremities, there is no tenderness to palpation, no pedal edema, no cyanosis or clubbing, no calf tenderness, normal cap refill, no joint swelling.  SKIN: Warm and dry, no erythema noted, no rashes or lesions.  NEUROLOGICAL: The patient is oriented to person, place and time. Strength and sensation are grossly intact. Face is symmetric.                    LABS:    Lab Results   Component Value Date    WBC 9.6 10/30/2017    HGB 11.7 (L) 10/30/2017    HCT 34.9 (L) 10/30/2017    MCV 98 10/30/2017     10/30/2017       Results for orders placed or performed during the hospital encounter of 10/24/17   Basic Metabolic Panel   Result Value Ref Range    Sodium 133 (L) 136 - 145 mmol/L    Potassium 4.6 3.5 - 5.0 mmol/L    Chloride 98 98 - 107 mmol/L    CO2 25 22 - 31 mmol/L    Anion Gap, Calculation 10 5 - 18 mmol/L    Glucose 148 (H) 70 - 125 mg/dL    Calcium 8.5 8.5 - 10.5 mg/dL    BUN 14 8 - 28 mg/dL    Creatinine 0.67 0.60 - 1.10 mg/dL    GFR MDRD Af Amer >60 >60 mL/min/1.73m2    GFR MDRD Non Af Amer >60 >60 mL/min/1.73m2           No results found for: HGBA1C  Vitamin D, Total (25-Hydroxy)   Date Value Ref Range Status   08/12/2016 29.6 (L) 30.0 - 80.0 ng/mL Final     Lab Results   Component Value Date    [id number] 04/01/2011        ASSESSMENT/PLAN:  1. Acute respiratory failure with hypoxemia    2. Adenocarcinoma of lung, stage 4, right    3. Essential hypertension    4. Insomnia      Plan of care will be ongoing with patient due to her hypoxia and oxygen needs.  His sats did drop lower last night and her right side breath sounds are definitely diminished.  We will encourage her to use IS every 4 while awake, and she will remain on oxygen.  Due to request for sleeping poor do well I have asked the staff to a 7 day sleep log and I will increase her melatonin to 6 mg p.o. nightly as needed.  Per granddaughter who is her only family member, a palliative consult was made.  Unsure at this time if a palliative care will make rounds in nursing homes however the plan will be to follow-up with palliative care.      Electronically signed by:  Sarika Hoang CNP  This progress note was completed using Dragon software and there may be grammatical errors.      25 minutes spent of which greater than 75 % was face to face communication with the patient about above plan of care

## 2021-06-13 NOTE — PROGRESS NOTES
[facility]      Code Status:  DNR  Visit Type: Review Of Multiple Medical Conditions     Facility:  [facility] [[id number]]           History of Present Illness: Susu Pinedo is a 89 y.o. female who is currently admitted to the TCU as a transfer from the hospital.  This is a 89-year-old who lives in a senior apartment by herself and uses a walker for ambulation.  She presented to the hospital with increasing shortness of breath and was noted to have a right-sided effusion bilateral right middle lobe consolidation with a small left lower lobe infiltrate.  She also was noted to have hypoxic respiratory failure and needed oxygen she was initially treated with Levaquin and advice for pulmonary follow-up with a repeat CT in 1 month.  Unfortunately she returned back to the hospital with recurrent shortness of breath and right-sided effusion.  A CT scan done on 10/24 showed that she had a right central lung mass and she had some lung nodules.  Patient underwent thoracenteses.  Imaging revealed posterior right upper lobe mass with satellite nodules with evidence of mediastinal adenopathy and right infraclavicular lymph nodes.  Subsequently a lymph node biopsy of her right infraclavicular lymph node revealed that she had metastatic adenocarcinoma.  She had oncology consultation and will need to see oncology regarding treatment options.  She is scheduled for a PET scan on 11/3/17 and will follow up with [facility] medical oncology as an outpatient she is denying any pain discomfort and seems to be otherwise doing well.  She continues to be short winded and short of breath and exam today suggestive of recurrence of right-sided pleural effusion she did have a PET scan done yesterday and plans to see medical oncology tomorrow.    Past Medical History:   Diagnosis Date     Acute kidney injury 3/25/2016     Aortic regurgitation      Asthma      Breast cancer 1971     COPD (chronic obstructive  pulmonary disease)     quit tobacco 1969     Diastolic CHF      GERD (gastroesophageal reflux disease)      Heart murmur     aortic regurgitation, mitral regurgitation     Hyperlipidemia      Hypertension      Hyponatremia     chronic     Lumbar stenosis      Mitral regurgitation      Osteoarthritis      Osteoporosis      Pulmonary hypertension      Past Surgical History:   Procedure Laterality Date     MASTECTOMY, RADICAL Bilateral 1971     OOPHORECTOMY      Description: Oophorectomy;  Recorded: 11/16/2008;     MD EXCISION SUBMAXILLARY GLAND      Description: Surgery Excision Of Submandibular (Submaxillary) Gland;  Recorded: 11/16/2008;     VERTEBROPLASTY  07/11/2014    for T12 compression fracture.     Family History   Problem Relation Age of Onset     No Medical Problems Mother      Ulcers Father      Stroke Father      Hypertension Sister      Alzheimer's disease Brother      Social History     Social History     Marital status:      Spouse name: N/A     Number of children: N/A     Years of education: N/A     Occupational History     Retired.      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     Social History Main Topics     Smoking status: Former Smoker     Quit date: 7/11/1965     Smokeless tobacco: Never Used     Alcohol use 0.6 oz/week     1 Cans of beer per week      Comment: 2 gin and tonics/day     Drug use: No     Sexual activity: Not on file     Other Topics Concern     Not on file     Social History Narrative    She is  twice.  Has 9 grandchildren.  None of them live in [place].  She lives in a senior living facility in [place] and is quite independent.  Granddaughter Carlita is very much involved.  She lives in [place].     Current Outpatient Prescriptions   Medication Sig Dispense Refill     acetaminophen (TYLENOL) 650 MG suppository Insert 1 suppository (650 mg total) into the rectum every 6 (six) hours as needed.  0     albuterol (PROAIR HFA)  90 mcg/actuation inhaler Inhale 1-2 puffs every 4 (four) hours as needed. Every 4-6 hours 1 Inhaler 0     aspirin 325 MG EC tablet Take 325 mg by mouth every 7 days.        calcium carbonate (OS-NICKIE) 600 mg (1,500 mg) tablet Take 600 mg by mouth 2 (two) times a day with meals.       cholecalciferol, vitamin D3, 1,000 unit tablet Take 2,000 Units by mouth 2 (two) times a day.        cloNIDine HCl (CATAPRES) 0.2 MG tablet Take 0.2 mg by mouth daily.       diltiazem (CARTIA XT) 240 MG 24 hr capsule Take 240 mg by mouth every evening.        fluticasone-salmeterol (ADVAIR) 500-50 mcg/dose DISKUS Inhale 1 puff 2 (two) times a day.       furosemide (LASIX) 20 MG tablet Take 20 mg by mouth daily.       ibuprofen (ADVIL,MOTRIN) 200 MG tablet Take 200 mg by mouth every 6 (six) hours as needed for pain.       ipratropium-albuterol (DUO-NEB) 0.5-2.5 mg/3 mL nebulizer Take 3 mL by nebulization every 6 (six) hours as needed. 25 vial 2     lidocaine 4 % patch Remove and discard patch with 12 hours or as directed by MD. 5 patch 0     losartan (COZAAR) 100 MG tablet Take 100 mg by mouth every evening.       magnesium oxide (MAG-OX) 400 mg tablet Take 400 mg by mouth daily.       melatonin 3 mg Tab tablet Take 6 mg by mouth at bedtime as needed.        multivitamin (MULTIVITAMIN) per tablet Take 1 tablet by mouth daily.       nebulizer and compressor Blanca duonebs 1 each 0     VIT A/VIT C/VIT E/ZINC/COPPER (ICAPS AREDS ORAL) Take 2 capsules by mouth daily.        No current facility-administered medications for this visit.      Allergies   Allergen Reactions     Amlodipine Besylate Hives     Hydrochlorothiazide      Annotation: caused low sodium       Other Allergy (See Comments) Hives     Contrast Media Ready-Box MISC, 04/10/2012.       Penicillins Hives         Review of Systems:    Constitutional: Negative.  Negative for fever, chills, Has activity change, appetite change and fatigue.   HENT: Negative for congestion and facial  swelling.   Has lost greater than 20 pounds recently which has been on and  Eyes: Negative for photophobia, redness and visual disturbance.   Respiratory: Negative for cough and chest tightness.    Has shortness of breath and is on oxygen  Cardiovascular: Negative for chest pain, palpitations and leg swelling.   Gastrointestinal: Negative for nausea, diarrhea, constipation, blood in stool and abdominal distention.   Genitourinary: Negative.    Musculoskeletal: Negative.  Generalized weakness uses a walker for ambulation  Skin: Negative.    Neurological: Negative for dizziness, tremors, syncope, weakness, light-headedness and headaches.   Hematological: Does not bruise/bleed easily.   Psychiatric/Behavioral: Negative.      Vitals:    11/07/17 1032   BP: 132/74   Pulse: 70   Temp: 98  F (36.7  C)       Physical Exam:    GENERAL: no acute distress. Cooperative in conversation.   HEENT: pupils are equal, round and reactive. Oral mucosa is moist and intact.  RESP:Chest symmetric. Regular respiratory rate. No stridor.  Currently on 2 L of oxygen by nasal cannula  Has significantly decreased breath sounds on the right side of her chest today.  CVS: S1S2  ABD: Nondistended, soft.  EXTREMITIES: No lower extremity edema.   NEURO: non focal. Alert and oriented x3.   PSYCH: within normal limits. No depression or anxiety.  SKIN: warm dry intact     radiology  Nm Pet Ct Skull To Mid Thigh    Result Date: 11/3/2017  [facility] PET FDG/CT 11/3/2017 1:08 PM INDICATION: Lung cancer, right, staging. Initial treatment strategy. TECHNIQUE: Serum glucose level 105 mg/dL. One hour post intravenous administration of 7.5 mCi F-18 FDG, PET imaging was performed from the skull base to the mid thighs utilizing attenuation correction with concurrent axial CT and PET/CT image fusion. Dose reduction techniques were used. COMPARISON: CT chest 10/25/2017 reviewed. FINDINGS: Irregular lobulated ill-defined mass in the posterior aspect right  upper lobe measuring up to 4.9 cm, similar to 10/25/2017, demonstrates heterogeneous mild uptake (SUVmax 3.5), consistent with malignancy. FDG avid right perihilar adenopathy involves right interlobar station 11R (SUVmax 3.1) and right hilar station 10R regions. FDG avid mediastinal adenopathy involves lower paratracheal station 4. Contralateral left perihilar station 10L and 11L adenopathy also present with mild uptake. FDG avid right infraclavicular adenopathy measuring 1.4 x 2.4 cm mild uptake (SUVmax 3.6), consistent with biopsy-proven sharath metastasis. Mild pleural nodularity in the right lung base with moderate uptake (SUVmax 4.1). Large partially loculated right pleural effusion. Normal adrenal glands. Moderate generalized cerebral volume loss with associated ex vacuo ventricular dilatation. Moderate chronic small vessel ischemic changes in the periventricular white matter. Bandlike irregular opacities in the left lung base demonstrate only low-level uptake, likely infectious/inflammatory. Mild cardiac enlargement. Dense coronary artery calcification and/or stents. Marked atherosclerotic calcifications elsewhere. Bilateral mastectomies. Left renal cysts. Cholelithiasis. Calcified splenic granuloma. Pelvic pessary. Colonic diverticula. Bony demineralization. Marked scattered degenerative changes spine. Thoracic kyphosis. Thoracolumbar curve. Chronic severe compression fracture T12.     CONCLUSION: Findings suspicious for right upper lobe primary lung cancer with metastases involving scattered thoracic lymph nodes as well as malignant right pleural involvement.      Assessment/Plan:    Metastatic adenocarcinoma of the lung stage IV.  Evaluated by medical oncology.  She wants to pursue medical treatment and has a plan to get a PET scan done this week after which he will be followed up in the clinic for consideration of chemotherapy  Hypoxic respiratory failure currently discharged on 2 L of oxygen by nasal  cannula.  Prior history of COPD.  Chronic diastolic heart failure.  Currently on low-dose Lasix  History of malignant right-sided recurrent pleural effusions which have been recurrent and it has been felt that she may need a Pleurx catheter placed in the near future if her pleural effusion recurs status post thoracentesis.  Exam today highly suspicious for recurrence and I am going to get her stat x-ray chest done.  Hyponatremia.  Stable  HTN-on Cozaar clonidine as well as Lasix  History of aortic regurgitation.  GERD  Moderate amount of malnutrition due to poor intake with an unintentional weight loss recently of about 17-20 pounds she has been started on nutritional supplements over here most likely due to underlying history of cancer.  Debilitation in an elderly female at baseline we will uses a walker  Patient is here for PT OT and rehab.  We will try to wean her oxygen as much as possible she has been started on nutritional supplements also  She had  a PET scan and will closely follow-up with her medical oncologist for consideration of chemotherapy.  She plans to see them tomorrow to discuss treatment options in the meantime will follow-up on the results of the x-ray I have a suspicion she may need a Pleurx catheter if her pleural effusion is any worse.  DEMETRIA 17/30  Total time spent was 35 minutes, more than half of it was in face-to-face counseling regarding disease state, treatment, side effects, documentation, review of clinical data and coordination of care    Electronically signed by: KIM Phillip  This progress note was completed using Dragon software and there may be grammatical errors.

## 2021-06-13 NOTE — PROGRESS NOTES
Clinic Note    Assessment:     Assessment and Plan:    1. Shortness of breath    The patient was admitted to a telemetry bed at [facility]. We tried to have the patient scheduled for a call and read chest CT at [facility] today but they do not have any availability in their schedule today. [facility] does not have any beds for her. She is de-sating down into the low 80s while ambulating short distances. She is not on home O2 therapy. She has not seen cardiology in three years. She has not seen pulmonology in years. She needs to have her new onset of shortness of breath worked up quickly in a more acute setting.     - Electrocardiogram Perform and Read  - [facility](CBC and Differential)  - C-Reactive Protein  - BNP(B-type Natriuretic Peptide)  - CT Chest With Contrast; Future  - Echo Complete; Future  - Ambulatory referral to Cardiology  - [facility] (CBC with Diff)  - Troponin I  - D-dimer, Quantitative    2. Abdominal pain    -This has not been alleviated with Prilosec. I ordered a CT of her abdomen and placed a referral to GI.     - [facility](CBC and Differential)  - C-Reactive Protein  - Comprehensive Metabolic Panel  - CT Abdomen Pelvis With Oral With IV Contrast; Future  - Ambulatory referral to Gastroenterology  - [facility] (CBC with Diff)               Subjective:      Susu Pinedo is a 89 y.o. female who is here with worsening shortness of breath.     Symptoms started about two weeks ago. Shortness of breath is not new for the patient but seems to be worse lately. Uses albuterol inhaler prn, duoneb daily, and advair twice daily for symptoms. Says that she has had to use her duonebs and albuterol more frequently lately due to symptoms. Says that she has to take time to recover when she gets up from bed to go use the restroom because she is so out of breath.     No chest pain. Reports CHOI. No new swelling in lower extremities. No new weight gain. Has not seen cardiology in three years.  Has not seen pulmonology in  years. No new cough. No wheezing.     Abdominal Pain: Patient says that she has had abdominal pain for the past two weeks that has been not getting better since starting prilosec. Reports increased gas. Pain is left lower quadrant and epigastric. Described as an ache. No new bowel habits. No blood in stool. No fevers. No N/V/D. No problems with constipation.     The following portions of the patient's history were reviewed and updated as appropriate: Allergies, Medications, Problem List.     Review of Systems:    Review is negative except for what is mentioned above.     Social Hx:    History   Smoking Status     Former Smoker     Quit date: 7/11/1965   Smokeless Tobacco     Not on file         Objective:     Vitals:    10/13/17 1022   BP: 136/78   Patient Site: Right Arm   Patient Position: Sitting   Cuff Size: Adult Regular   Pulse: 96   SpO2: 91%   Weight: 103 lb 11.2 oz (47 kg)       Exam:    General: No apparent distress. Calm. Alert and Oriented X3. Pt behavior is appropriate.  Head:Atraumatic. Normocephalic, non-tender to palpation  Neck: Supple. No JVD. Full ROM. No adenopathy  Eyes: PERRL, No discharge. No strabismus. No nystagmus.  Ears: TMs pearly gray with landmarks visible.   Nose/Mouth/Throat: Patent nares, no oral lesions, pharynx clear and without exudate. Uvula mid-line. Nasal septum mid-line. Clear turbinates.   Lymph: No axillar or cervical adenopathy.   Chest/Lungs: Normal chest wall, clear to auscultation, normal respiratory effort and rate.   Heart/Pulses: Regular rate and rhythm, strong and equal radial pulses, no murmurs. Capillary refill <2 seconds. No edema.   Abdomen: Soft, no palpable masses. No hepatosplenomegaly, tenderness with palpation to left lower quadrant and epi-gastric area. Bowel sounds active in all quadrants. No increased tympany.   Musculoskeletal: No CVA tenderness with palpation. Good ROM with extremities.   Neurologic: Interactive, alert, no focal findings, CNs intact.    Skin: Warm, dry. Normal hair pattern. Free of lesions. Normal skin turgor.       Patient Active Problem List   Diagnosis     Aortic Regurgitation     Diastolic Dysfunction     Hyperlipidemia     Mitral Regurgitation     Emphysema     Osteoporosis Senile     GERD (gastroesophageal reflux disease)     CHF (congestive heart failure)     Lymphedema     Essential hypertension     Current Outpatient Prescriptions   Medication Sig Dispense Refill     acetaminophen (TYLENOL) 325 MG tablet Take 650 mg by mouth every 6 (six) hours as needed for pain.       ADVAIR DISKUS 500-50 mcg/dose DISKUS USE 1 INHALATION TWICE A  each 3     albuterol (PROAIR HFA) 90 mcg/actuation inhaler Inhale 1-2 puffs every 4 (four) hours as needed. Every 4-6 hours 1 Inhaler 0     aspirin 325 MG EC tablet Take 325 mg by mouth daily as needed.        bisacodyl (DULCOLAX) 10 mg suppository Insert 10 mg into the rectum daily as needed.       calcium carbonate (OS-NICKIE) 600 mg (1,500 mg) tablet Take 600 mg by mouth 2 (two) times a day with meals.       cholecalciferol, vitamin D3, 1,000 unit tablet Take 2,000 Units by mouth daily.       cloNIDine HCl (CATAPRES) 0.2 MG tablet TAKE 1 TABLET DAILY 90 tablet 2     diltiazem (CARTIA XT) 240 MG 24 hr capsule Take 240 mg by mouth every evening.        furosemide (LASIX) 20 MG tablet TAKE 1 TABLET DAILY 90 tablet 3     ibuprofen (ADVIL,MOTRIN) 200 MG tablet Take 200 mg by mouth every 6 (six) hours as needed for pain.       ipratropium-albuterol (DUO-NEB) 0.5-2.5 mg/3 mL nebulizer Take 3 mL by nebulization every 6 (six) hours as needed. 25 vial 2     losartan (COZAAR) 100 MG tablet TAKE 1 TABLET EVERY EVENING 90 tablet 2     melatonin 3 mg Tab tablet Take 3 mg by mouth bedtime as needed.       multivitamin (MULTIVITAMIN) per tablet Take 1 tablet by mouth daily.       nebulizer and compressor Blanca duonebs 1 each 0     omeprazole (PRILOSEC) 20 MG capsule Take 1 capsule (20 mg total) by mouth daily. 90  capsule 1     senna-docusate (PERICOLACE) 8.6-50 mg tablet Take 1 tablet by mouth bedtime as needed for constipation.       VIT A/VIT C/VIT E/ZINC/COPPER (ICAPS AREDS ORAL) Take 2 capsules by mouth daily.        No current facility-administered medications for this visit.        Total time spent with patient was 60 minutes with >50% of time spent in face-to-face counseling regarding the above plan       Pee Aguilera CNP (Rob)    10/13/2017

## 2021-06-13 NOTE — PROGRESS NOTES
[facility]      Code Status:  DNR  Visit Type: H & P     Facility:  [facility] [[id number]]           History of Present Illness: Susu Pinedo is a 89 y.o. female who is currently admitted to the TCU as a transfer from the hospital.  This is a 89-year-old who lives in a senior apartment by herself and uses a walker for ambulation.  She presented to the hospital with increasing shortness of breath and was noted to have a right-sided effusion bilateral right middle lobe consolidation with a small left lower lobe infiltrate.  She also was noted to have hypoxic respiratory failure and needed oxygen she was initially treated with Levaquin and advice for pulmonary follow-up with a repeat CT in 1 month.  Unfortunately she returned back to the hospital with recurrent shortness of breath and right-sided effusion.  A CT scan done on 10/24 showed that she had a right central lung mass and she had some lung nodules.  Patient underwent thoracenteses.  Imaging revealed posterior right upper lobe mass with satellite nodules with evidence of mediastinal adenopathy and right infraclavicular lymph nodes.  Subsequently a lymph node biopsy of her right infraclavicular lymph node revealed that she had metastatic adenocarcinoma.  She had oncology consultation and will need to see oncology regarding treatment options.  She is scheduled for a PET scan on 11/3/17 and will follow up with [facility] medical oncology as an outpatient she is denying any pain discomfort and seems to be otherwise doing well.    Past Medical History:   Diagnosis Date     Acute kidney injury 3/25/2016     Aortic regurgitation      Asthma      Breast cancer 1971     COPD (chronic obstructive pulmonary disease)     quit tobacco 1969     Diastolic CHF      GERD (gastroesophageal reflux disease)      Heart murmur     aortic regurgitation, mitral regurgitation     Hyperlipidemia      Hypertension      Hyponatremia     chronic      Lumbar stenosis      Mitral regurgitation      Osteoarthritis      Osteoporosis      Pulmonary hypertension      Past Surgical History:   Procedure Laterality Date     MASTECTOMY, RADICAL Bilateral 1971     OOPHORECTOMY      Description: Oophorectomy;  Recorded: 11/16/2008;     DC EXCISION SUBMAXILLARY GLAND      Description: Surgery Excision Of Submandibular (Submaxillary) Gland;  Recorded: 11/16/2008;     VERTEBROPLASTY  07/11/2014    for T12 compression fracture.     Family History   Problem Relation Age of Onset     No Medical Problems Mother      Ulcers Father      Stroke Father      Hypertension Sister      Alzheimer's disease Brother      Social History     Social History     Marital status:      Spouse name: N/A     Number of children: N/A     Years of education: N/A     Occupational History     Retired.      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     Social History Main Topics     Smoking status: Former Smoker     Quit date: 7/11/1965     Smokeless tobacco: Never Used     Alcohol use 0.6 oz/week     1 Cans of beer per week      Comment: 2 gin and tonics/day     Drug use: No     Sexual activity: Not on file     Other Topics Concern     Not on file     Social History Narrative    She is  twice.  Has 9 grandchildren.  None of them live in [place].  She lives in a senior living facility in [place] and is quite independent.  Granddaughter Carlita is very much involved.  She lives in [place].     Current Outpatient Prescriptions   Medication Sig Dispense Refill     acetaminophen (TYLENOL) 650 MG suppository Insert 1 suppository (650 mg total) into the rectum every 6 (six) hours as needed.  0     albuterol (PROAIR HFA) 90 mcg/actuation inhaler Inhale 1-2 puffs every 4 (four) hours as needed. Every 4-6 hours 1 Inhaler 0     aspirin 325 MG EC tablet Take 325 mg by mouth every 7 days.        calcium carbonate (OS-NICKIE) 600 mg (1,500 mg) tablet Take 600 mg by  mouth 2 (two) times a day with meals.       cholecalciferol, vitamin D3, 1,000 unit tablet Take 2,000 Units by mouth 2 (two) times a day.        cloNIDine HCl (CATAPRES) 0.2 MG tablet Take 0.2 mg by mouth daily.       diltiazem (CARTIA XT) 240 MG 24 hr capsule Take 240 mg by mouth every evening.        fluticasone-salmeterol (ADVAIR) 500-50 mcg/dose DISKUS Inhale 1 puff 2 (two) times a day.       furosemide (LASIX) 20 MG tablet Take 20 mg by mouth daily.       ibuprofen (ADVIL,MOTRIN) 200 MG tablet Take 200 mg by mouth every 6 (six) hours as needed for pain.       ipratropium-albuterol (DUO-NEB) 0.5-2.5 mg/3 mL nebulizer Take 3 mL by nebulization every 6 (six) hours as needed. 25 vial 2     lidocaine 4 % patch Remove and discard patch with 12 hours or as directed by MD. 5 patch 0     losartan (COZAAR) 100 MG tablet Take 100 mg by mouth every evening.       magnesium oxide (MAG-OX) 400 mg tablet Take 400 mg by mouth daily.       melatonin 3 mg Tab tablet Take 3 mg by mouth bedtime as needed.       multivitamin (MULTIVITAMIN) per tablet Take 1 tablet by mouth daily.       nebulizer and compressor Blanca duonebs 1 each 0     VIT A/VIT C/VIT E/ZINC/COPPER (ICAPS AREDS ORAL) Take 2 capsules by mouth daily.        No current facility-administered medications for this visit.      Allergies   Allergen Reactions     Amlodipine Besylate Hives     Hydrochlorothiazide      Annotation: caused low sodium       Other Allergy (See Comments) Hives     Contrast Media Ready-Box MISC, 04/10/2012.       Penicillins Hives         Review of Systems:    Constitutional: Negative.  Negative for fever, chills, Has activity change, appetite change and fatigue.   HENT: Negative for congestion and facial swelling.   Has lost greater than 20 pounds recently which has been on and  Eyes: Negative for photophobia, redness and visual disturbance.   Respiratory: Negative for cough and chest tightness.    Has shortness of breath and is on  oxygen  Cardiovascular: Negative for chest pain, palpitations and leg swelling.   Gastrointestinal: Negative for nausea, diarrhea, constipation, blood in stool and abdominal distention.   Genitourinary: Negative.    Musculoskeletal: Negative.  Generalized weakness uses a walker for ambulation  Skin: Negative.    Neurological: Negative for dizziness, tremors, syncope, weakness, light-headedness and headaches.   Hematological: Does not bruise/bleed easily.   Psychiatric/Behavioral: Negative.      Vitals:    11/02/17 1038   BP: 150/84   Pulse: 76   Temp: 98  F (36.7  C)       Physical Exam:    GENERAL: no acute distress. Cooperative in conversation.   HEENT: pupils are equal, round and reactive. Oral mucosa is moist and intact.  RESP:Chest symmetric. Regular respiratory rate. No stridor.  Currently on 2 L of oxygen by nasal cannula  CVS: S1S2  ABD: Nondistended, soft.  EXTREMITIES: No lower extremity edema.   NEURO: non focal. Alert and oriented x3.   PSYCH: within normal limits. No depression or anxiety.  SKIN: warm dry intact       Labs:    Recent Results (from the past 240 hour(s))   ECG 12 lead nursing unit performed   Result Value Ref Range    SYSTOLIC BLOOD PRESSURE  mmHg    DIASTOLIC BLOOD PRESSURE  mmHg    VENTRICULAR RATE 61 BPM    ATRIAL RATE 61 BPM    P-R INTERVAL 186 ms    QRS DURATION 86 ms    Q-T INTERVAL 426 ms    QTC CALCULATION (BEZET) 428 ms    P Axis 1 degrees    R AXIS -46 degrees    T AXIS 61 degrees    MUSE DIAGNOSIS       Normal sinus rhythm with sinus arrhythmia  Left anterior fascicular block  Moderate voltage criteria for LVH, may be normal variant  Abnormal ECG  When compared with ECG of 12-AUG-2016 10:20,  Premature atrial complexes are no longer Present  Confirmed by CLAYTON HERNANDEZ MD LOC: ([id number]) on 10/24/2017 3:14:23 PM     Basic Metabolic Panel   Result Value Ref Range    Sodium 129 (L) 136 - 145 mmol/L    Potassium 4.7 3.5 - 5.0 mmol/L    Chloride 94 (L) 98 - 107 mmol/L    CO2 28 22  - 31 mmol/L    Anion Gap, Calculation 7 5 - 18 mmol/L    Glucose 91 70 - 125 mg/dL    Calcium 9.7 8.5 - 10.5 mg/dL    BUN 15 8 - 28 mg/dL    Creatinine 0.74 0.60 - 1.10 mg/dL    GFR MDRD Af Amer >60 >60 mL/min/1.73m2    GFR MDRD Non Af Amer >60 >60 mL/min/1.73m2   Troponin I   Result Value Ref Range    Troponin I 0.02 0.00 - 0.29 ng/mL   INR   Result Value Ref Range    INR 0.97 0.90 - 1.10   BNP(B-type Natriuretic Peptide)   Result Value Ref Range     0 - 167 pg/mL   HM1 (CBC with Diff)   Result Value Ref Range    WBC 10.1 4.0 - 11.0 thou/uL    RBC 3.87 3.80 - 5.40 mill/uL    Hemoglobin 12.6 12.0 - 16.0 g/dL    Hematocrit 36.9 35.0 - 47.0 %    MCV 95 80 - 100 fL    MCH 32.6 27.0 - 34.0 pg    MCHC 34.1 32.0 - 36.0 g/dL    RDW 12.4 11.0 - 14.5 %    Platelets 430 140 - 440 thou/uL    MPV 8.8 8.5 - 12.5 fL    Neutrophils % 77 (H) 50 - 70 %    Lymphocytes % 6 (L) 20 - 40 %    Monocytes % 11 (H) 2 - 10 %    Eosinophils % 4 0 - 6 %    Basophils % 1 0 - 2 %    Neutrophils Absolute 7.7 2.0 - 7.7 thou/uL    Lymphocytes Absolute 0.7 (L) 0.8 - 4.4 thou/uL    Monocytes Absolute 1.1 (H) 0.0 - 0.9 thou/uL    Eosinophils Absolute 0.4 0.0 - 0.4 thou/uL    Basophils Absolute 0.1 0.0 - 0.2 thou/uL   Sodium, Random Urine   Result Value Ref Range    Sodium, Urine 40 mmol/L   Osmolality, Random, Urine   Result Value Ref Range    Osmolality, Urine 286 (L) 300 - 900 mOsm/kg   Troponin I   Result Value Ref Range    Troponin I 0.02 0.00 - 0.29 ng/mL   TSH   Result Value Ref Range    TSH 2.06 0.30 - 5.00 uIU/mL   Cortisol   Result Value Ref Range    Cortisol 8.5 ug/dL   Procalcitonin   Result Value Ref Range    Procalcitonin <0.05 0.00 - 0.49 ng/mL   Lactate Dehydrogenase (LDH)   Result Value Ref Range    LD (LDH) 220 125 - 220 U/L   Protein, Total   Result Value Ref Range    Protein, Total 5.6 (L) 6.0 - 8.0 g/dL   Magnesium   Result Value Ref Range    Magnesium 1.4 (L) 1.8 - 2.6 mg/dL   Troponin I   Result Value Ref Range    Troponin  I 0.01 0.00 - 0.29 ng/mL   Magnesium   Result Value Ref Range    Magnesium 1.5 (L) 1.8 - 2.6 mg/dL   Basic Metabolic Panel   Result Value Ref Range    Sodium 130 (L) 136 - 145 mmol/L    Potassium 4.3 3.5 - 5.0 mmol/L    Chloride 98 98 - 107 mmol/L    CO2 26 22 - 31 mmol/L    Anion Gap, Calculation 6 5 - 18 mmol/L    Glucose 88 70 - 125 mg/dL    Calcium 8.5 8.5 - 10.5 mg/dL    BUN 14 8 - 28 mg/dL    Creatinine 0.72 0.60 - 1.10 mg/dL    GFR MDRD Af Amer >60 >60 mL/min/1.73m2    GFR MDRD Non Af Amer >60 >60 mL/min/1.73m2   Cell Ct/Diff, Body Fluid   Result Value Ref Range    Color, Fluid Red     Appearance, Fluid Cloudy     WBC, Fluid 1266 (H) 0 - 99 /uL    RBC, Fluid >50,000 (!) <50,000 /ul    Neutrophil % 26 (H) <=25 %    Lymphocyte % 60 <=78 %    Monocyte % 5 <=71 %    Macrophage % 2 <=71 %    Mesothelial % 1 <=1 %    Eosinophil % 3 (H) <=1 %    Other Cells % 3 (H) <=1 %   Lactate Dehydrogenase (LDH), Body Fluid   Result Value Ref Range    LD, Fluid 293 U/L   Protein, Body Fluid   Result Value Ref Range    Protein, Fluid 3.5 g/dL   Medical Cytology   Result Value Ref Range    Case Report       Medical Cytology                                  Case: [id number]                                   Authorizing Provider:  Edy De Dios DO        Collected:           10/25/2017 1153              Ordering Location:     [facility]      Received:            10/25/2017 1306                                     Emergency Department                                                         Pathologist:           Jackson Cardenas MD                                                         Specimen:    Fluid, Pleural, right                                                                      Final Diagnosis       RIGHT PLEURAL FLUID, CYTOLOGY AND CELL BLOCK:    -  POSITIVE FOR ADENOCARCINOMA, COMPATIBLE WITH LUNG PRIMARY     -  PLEASE SEE COMMENT    MCSS    Comment       Please also see concurrent fine needle aspiration  of right infraclavicular lymph node ([id number]), significant for metastatic adenocarcinoma of the lung.    Microscopic Description       H&E stained cell block sections show leukocytes, reactive appearing mesothelial cells, and several atypical epithelial cell groups with glandular differentiation. The atypical cells are increased nuclear-cytoplasmic ratios, finely granular nuclear chromatin, and prominent eosinophilic nucleoli. Similar cellular elements are identified on Pap and Diff-Quick stained cytology preps.    Clinical Information Recurrent pleural effusion     Specimen Description       950 ml cloudy dark red fluid    Prepared are:   1 air dried slide   1 SurePath slide   1 cell block    General Path Interpretation Positive for malignant cells (!) Negative for malignant cells, Non-Diagnostic    Charges CPT:  62630, 40612  ICD-10:  C78.2    Culture/Gram Stain: Body Fluid   Result Value Ref Range    Culture No Growth     Gram Stain Result 3+ Polymorphonuclear leukocytes     Gram Stain Result No organisms seen    Medical cytology   Result Value Ref Range    Case Report       Medical Cytology                                  Case: [id number]                                   Authorizing Provider:  Margie Hsieh MD           Collected:           10/26/2017 1557              Ordering Location:     [place] [address] Received:            10/26/2017 1620                                     Cardiac/Telemetry                                                            Pathologist:           Jackson Cardenas MD                                                         Specimen:    Lymph Node, right infraclavicular lymph node                                               Final Diagnosis       RIGHT INFRACLAVICULAR LYMPH NODE, FINE NEEDLE ASPIRATION:    - METASTATIC ADENOCARCINOMA OF THE LUNG    MCSS    Comment       The combined morphologic and immunophenotypic features of this lesion are consistent with metastatic  adenocarcinoma arising in the lung.  It should be noted that only scant tumor remains in the paraffin blocks (A1, A2).  There may well be sufficient material remaining in block A2 for molecular diagnostic studies, if needed.      Dr. Cb Reese has also reviewed this case and concurs.    Microscopic Description       Pap and Diff-Quik stained smears prepared from right infraclavicular lymph node show several clusters of epithelial cells with mild to moderate nuclear pleomorphism. Some of these clusters manifest slight increase in depths of focus. The background is bloody, and several lymphocytes and occasional foamy histiocytes are identified. H&E stained cell block sections show blood and rare clusters of atypical epithelial cells with features described above.     Immunohistochemical stains are performed on paraffin sections of cell block in order to characterize the neoplastic cells. The results are as follows:       TTF-1: Positive    Thyroglobulin: Negative    Napsin-A: Positive    Cytokeratin-7: Positive    Cytokeratin-20: Negative    p63: Negative    All controls stain appropriately.    Clinical Information Right infraclavicular lymph node     Specimen Description       Ultrasound guided FNA performed by Dr. Jean Torres with 6 passes.   6 Diff quick slides   4 Fixed slides    2 Cell blocks - 1 from aspirates and 1 from cores    Initial Cytologic Evaluation       Site #1, Episode #1, # Passes 6 (#1-3,6 aspirates, #4-5 cores): Malignant. [facility]    General Path Interpretation Positive for malignant cells (!) Negative for malignant cells, Non-Diagnostic    Charges       CPT: 95734, 78112, 27785, 88341 X5  ICD-10: C77.9    CC:   Jean Torres MD   Magnesium   Result Value Ref Range    Magnesium 1.7 (L) 1.8 - 2.6 mg/dL   Magnesium   Result Value Ref Range    Magnesium 1.7 (L) 1.8 - 2.6 mg/dL   Creatinine   Result Value Ref Range    Creatinine 0.58 (L) 0.60 - 1.10 mg/dL    GFR MDRD Af Amer >60 >60 mL/min/1.73m2     GFR MDRD Non Af Amer >60 >60 mL/min/1.73m2   Magnesium   Result Value Ref Range    Magnesium 2.2 1.8 - 2.6 mg/dL   Magnesium   Result Value Ref Range    Magnesium 2.0 1.8 - 2.6 mg/dL   Basic Metabolic Panel   Result Value Ref Range    Sodium 128 (L) 136 - 145 mmol/L    Potassium 5.1 (H) 3.5 - 5.0 mmol/L    Chloride 96 (L) 98 - 107 mmol/L    CO2 24 22 - 31 mmol/L    Anion Gap, Calculation 8 5 - 18 mmol/L    Glucose 100 70 - 125 mg/dL    Calcium 8.3 (L) 8.5 - 10.5 mg/dL    BUN 16 8 - 28 mg/dL    Creatinine 0.63 0.60 - 1.10 mg/dL    GFR MDRD Af Amer >60 >60 mL/min/1.73m2    GFR MDRD Non Af Amer >60 >60 mL/min/1.73m2   HM2(CBC W/O DIFF)   Result Value Ref Range    WBC 9.6 4.0 - 11.0 thou/uL    RBC 3.55 (L) 3.80 - 5.40 mill/uL    Hemoglobin 11.7 (L) 12.0 - 16.0 g/dL    Hematocrit 34.9 (L) 35.0 - 47.0 %    MCV 98 80 - 100 fL    MCH 33.0 27.0 - 34.0 pg    MCHC 33.5 32.0 - 36.0 g/dL    RDW 12.2 11.0 - 14.5 %    Platelets 388 140 - 440 thou/uL    MPV 8.5 8.5 - 12.5 fL   Basic Metabolic Panel   Result Value Ref Range    Sodium 133 (L) 136 - 145 mmol/L    Potassium 4.6 3.5 - 5.0 mmol/L    Chloride 98 98 - 107 mmol/L    CO2 25 22 - 31 mmol/L    Anion Gap, Calculation 10 5 - 18 mmol/L    Glucose 148 (H) 70 - 125 mg/dL    Calcium 8.5 8.5 - 10.5 mg/dL    BUN 14 8 - 28 mg/dL    Creatinine 0.67 0.60 - 1.10 mg/dL    GFR MDRD Af Amer >60 >60 mL/min/1.73m2    GFR MDRD Non Af Amer >60 >60 mL/min/1.73m2     Xr Chest Ap Portable    Result Date: 10/30/2017  XR CHEST AP PORTABLE 10/30/2017 10:38 AM INDICATION: pleural effusion follow up COMPARISON: 10/24/2017 FINDINGS: Increase in interstitial prominence raises question of increasing interstitial edema. Blunting of both costophrenic angles unchanged. No change in the infiltrate or atelectasis present right base. Modest cardiomegaly unchanged. Scoliosis.    Xr Chest Ap Portable    Result Date: 10/13/2017  XR CHEST AP PORTABLE 10/13/2017 2:16 PM INDICATION: sob COMPARISON: 10/25/2016  FINDINGS: Lung volumes are much lower. There are small bilateral pleural effusions and appears new as well and diffuse interstitial prominence. Moderate cardiomegaly unchanged. Findings most suggestive of CHF exacerbation.    Xr Chest Pa And Lateral    Result Date: 10/24/2017  XR CHEST PA AND LATERAL 10/24/2017 1:29 PM INDICATION: sob COMPARISON: 10/15/2017 FINDINGS: Minimal change. Small right pleural effusion persists and there is likely partial atelectasis or consolidation involving right lung base. Left lung remains clear. Heart size likely upper limits of normal. Tortuous calcified thoracic aorta unchanged. Osteopenia, scoliosis and kyphosis.    Ct Chest Without Contrast    Result Date: 10/25/2017  CT CHEST WO CONTRAST 10/25/2017 3:29 PM INDICATION: Pleural effusion. Follow up after thoracentesis. TECHNIQUE: Routine chest. Dose reduction techniques were used. IV CONTRAST: None. COMPARISON: 10/24/2017. 10/14/2017. FINDINGS: LUNGS AND PLEURA: Posterior-medial right upper lobe irregular mass like opacity measuring 4.3 x 4.9 x 4.6 cm (series 2, image 33). Finding extends towards the right suprahilar region. Small surrounding satellite nodules and mild thickening. No significant change of irregular nodular opacity in the left lower lobe measuring 2.1 x 2.5 cm (image 57) and additional clustered left lower lobe nodules up to roughly 0.7 cm (image 55). Improved but persistent small right pleural effusion. Minimal pleural fluid. No pneumothorax. MEDIASTINUM: Questionable enlarged right infraclavicular node measuring 1.1 x 1.7 cm (series 2, image 10). Mediastinal adenopathy with example precarinal node measuring 1.1 x 1.6 cm brain image 36). Right hilar adenopathy better seen on prior contrast-enhanced CT, though likely not substantially changed. Trace pericardial effusion. Stable cardiomegaly. Severe pulmonary trunk enlargement measuring 4.3 cm. Severe coronary, mitral annular and aortic calcifications. Ectatic  thoracic aorta. LIMITED UPPER ABDOMEN: Severe vascular calcifications. Left renal hypo- and hyperdensities. MUSCULOSKELETAL: Diffuse bony demineralization. Stable severe T12 compression fracture. Stable focal sclerosis within T6.     CONCLUSION: 1.  Posterior-medial right upper lobe masslike consolidation. Finding is worrisome for neoplasm, particularly if no infectious / inflammatory symptoms are present. If clinical concern for masslike pneumonia, short-term follow-up could be performed. Surrounding satellite nodules and thickening are present. 2.  No significant change of irregular left lower lobe consolidation and surrounding nodules. 3.  Mild mediastinal and right hilar adenopathy without significant change. Question of additional enlarged right infraclavicular node, though not well evaluated. 4.  Improved but persistent small right pleural effusion. Tiny left pleural effusion. 5.  Severely enlarged pulmonary trunk suggesting pulmonary hypertension. 6.  Small left renal hypo- and hyperdensities, possibly simple and complex cysts, though not well evaluated. Renal ultrasound could be performed. NOTE: ABNORMAL REPORT THE DICTATION ABOVE DESCRIBES AN ABNORMALITY FOR WHICH FOLLOW-UP IS NEEDED.     Ct Chest Without Contrast    Result Date: 10/24/2017  CT CHEST WO CONTRAST 10/24/2017 3:43 PM INDICATION: Dyspnea chronic, had xray TECHNIQUE: Routine chest. Dose reduction techniques were used. IV CONTRAST: None COMPARISON: 10/14/2017 FINDINGS: LUNGS AND PLEURA: Moderate size recurrent right pleural effusion. There may be some nodularity involving the posterior inferior pleural space. 3 x 4 cm central zone of apparent soft tissue consolidation best seen on image 40 indeterminate but could represent a central neoplasm. Irregular nodular foci posterior left lung base unchanged are indeterminate though favored to be inflammatory. MEDIASTINUM: Heavy atherosclerotic calcifications of aorta and coronary arteries. No definite  lymphadenopathy. LIMITED UPPER ABDOMEN: Atherosclerotic calcifications. MUSCULOSKELETAL: Severe compression fracture involving T12 with focal scoliosis and kyphosis unchanged     CONCLUSION: 1.  Recurrent right pleural effusion is nearly the same size as prior study even with an interval thoracentesis. 2.  Probable central right lung mass and indeterminate nodules at left lung base. Recommend follow-up chest CT with contrast after repeat right thoracentesis for best evaluation.    Xr Chest Lateral Decubitus Right    Result Date: 10/15/2017  XR CHEST LATERAL DECUBITUS RIGHT 10/15/2017 11:40 AM INDICATION: evaluate plueural effision COMPARISON: 10/13/2017 and 10/25/2016 FINDINGS: Moderate right pleural effusion slightly increased since 10/13/2017. Tiny left effusion unchanged. Tortuous thoracic aorta. Heart size mildly enlarged.    Xr Swallow Study W Speech    Result Date: 10/18/2017  XR SWALLOW STUDY W SPEECH 10/18/2017 1:51 PM INDICATION: Dysphagia. TECHNIQUE: Routine. COMPARISON: None. FINDINGS: FLUOROSCOPIC TIME: .6 minutes NUMBER OF IMAGES: 0 Swallow study with Speech Pathology using multiple barium thicknesses. No penetration or aspiration with all barium consistencies. Mildly prominent cricopharyngeus muscle.     CONCLUSION: 1.  No increased risk for aspiration.     Cta Chest Pe Run    Result Date: 10/14/2017  CTA CHEST PE RUN 10/14/2017, 6:15 AM INDICATION: Short of breath. TECHNIQUE: Helical acquisition through the chest was performed during the arterial phase of contrast enhancement using IV contrast. 2D and 3D reconstructions were performed by the CT technologist. Dose reduction techniques were used. IV CONTRAST: 12 hour pre-med for contrast allergy. COMPARISON: None. FINDINGS: ANGIOGRAM CHEST: Negative for pulmonary emboli. Negative for thoracic aortic dissection and aneurysms. LUNGS AND PLEURA: No pneumothorax. Moderate right pleural effusion. Right lower lung compressive atelectasis. Small amount of left  lower lobe infiltrate. Small area consolidation right middle lobe. Faint nodular areas both upper lobes and left lower lobe. MEDIASTINUM: Pulmonary artery is enlarged. No pericardial effusion. Nonspecific right hilar adenopathy measures 1.8 x 1.5 cm. Nonspecific middle mediastinal lymph nodes. Nonspecific left hilar lymph node. LIMITED UPPER ABDOMEN: Atherosclerosis involving the upper abdominal aorta. Left renal cysts suspected. MUSCULOSKELETAL: Severe compression deformity T11 level. Multilevel thoracic degenerative changes.     CONCLUSION: 1.  No aneurysm or dissection involving the thoracic aorta. 2.  No CT evidence of pulmonary emboli. 3.  Nonspecific right hilar adenopathy. 4.  Moderate right pleural effusion. 5.  Pulmonary hypertension. 6.  Atherosclerosis. 7.  Small amount of left lower lobe infiltrate. 8.  Minimal bilateral areas of nodularity which could be inflammatory or neoplastic.     Us Lymph Node Biopsy    Result Date: 10/26/2017  1.  FINE-NEEDLE ASPIRATION RIGHT INFRACLAVICULAR LYMPH NODE 2.  PERCUTANEOUS BIOPSY RIGHT INFRACLAVICULAR LYMPH NODE 3.  ULTRASOUND GUIDANCE 10/26/2017 4:00 PM INDICATION: Indeterminate right infraclavicular lymph node in patient with right upper lobe lung mass. PROCEDURE: Informed consent obtained. Time out performed. The site was prepped and draped in sterile fashion. 10 mL of 1% lidocaine was infused into the local soft tissues. Under direct ultrasound guidance, multiple fine-needle aspirates of the nodule were obtained. Next two 18-gauge percutaneous core biopsies obtained. The tissue was felt to be adequate by pathology. RADIOLOGIC SUPERVISION AND INTERPRETATION: ULTRASOUND GUIDANCE: Images demonstrate the FNA and biopsy needles within the right infraclavicular lymph node.     CONCLUSION: 1.  Ultrasound-guided fine-needle aspiration of right infraclavicular lymph node. 2.  Ultrasound-guided percutaneous biopsy of right infraclavicular lymph node.    Us  Thoracentesis    Result Date: 10/25/2017  1. RIGHT THORACENTESIS 2. ULTRASOUND GUIDANCE 10/25/2017 11:55 AM INDICATION: Pleural effusion. PROCEDURE: Informed consent obtained. Time out performed. The chest was prepped and draped in sterile fashion. 10 mL of 1 % lidocaine was infused into the local soft tissues. Under direct ultrasound guidance, a 5 Luxembourger Yueh catheter system was placed into the pleural effusion. 1 liters of serosanguineous yellow fluid were removed and sent to lab, if requested. Patient tolerated procedure well. RADIOLOGIC SUPERVISION AND INTERPRETATION: ULTRASOUND GUIDANCE: Images demonstrate the pleural effusion. Catheter seen in good position.     CONCLUSION: Status post right ultrasound-guided thoracentesis.    Us Thoracentesis    Result Date: 10/16/2017  1. RIGHT THORACENTESIS 2. ULTRASOUND GUIDANCE 10/16/2017 9:16 AM INDICATION: Pleural effusion. PROCEDURE: Informed consent obtained. Time out performed. The chest was prepped and draped in sterile fashion. 10 mL of 1 % lidocaine was infused into the local soft tissues. Under direct ultrasound guidance, a 5 Luxembourger Yueh catheter system was placed into the pleural effusion. 0.8 liters of slightly blood tinged thin fluid were removed and sent to lab, if requested. Patient tolerated procedure well. RADIOLOGIC SUPERVISION AND INTERPRETATION: ULTRASOUND GUIDANCE: Images demonstrate the pleural effusion. Catheter seen in good position.     CONCLUSION: Status post right ultrasound-guided thoracentesis.      Assessment/Plan:    Metastatic adenocarcinoma of the lung stage IV.  Evaluated by medical oncology.  She wants to pursue medical treatment and has a plan to get a PET scan done this week after which he will be followed up in the clinic for consideration of chemotherapy  Hypoxic respiratory failure currently discharged on 2 L of oxygen by nasal cannula.  Prior history of COPD.  Chronic diastolic heart failure.  Currently on low-dose Lasix  History of  malignant right-sided recurrent pleural effusions which have been recurrent and it has been felt that she may need a Pleurx catheter placed in the near future if her pleural effusion recurs status post thoracentesis.  Hyponatremia.  Stable  HTN-on Cozaar clonidine as well as Lasix  History of aortic regurgitation.  GERD  Moderate amount of malnutrition due to poor intake with an unintentional weight loss recently of about 17-20 pounds she has been started on nutritional supplements over here most likely due to underlying history of cancer.  Debilitation in an elderly female at baseline we will uses a walker  Patient is here for PT OT and rehab.  We will try to wean her oxygen as much as possible she has been started on nutritional supplements also  She has been scheduled for a PET scan and will closely follow-up with her medical oncologist for consideration of chemotherapy.  Total time spent was 45 minutes, more than half of it was in face-to-face counseling regarding disease state, treatment, side effects, documentation, review of clinical data and coordination of care    Electronically signed by: KIM Phillip  This progress note was completed using Dragon software and there may be grammatical errors.

## 2021-06-14 NOTE — PROGRESS NOTES
[facility]      Code Status:  DNR  Visit Type: Review Of Multiple Medical Conditions     Facility:  [facility] [[id number]]           History of Present Illness: Susu Pinedo is a 89 y.o. female who is currently admitted to the TCU as a transfer from the hospital.  This is a 89-year-old who lives in a senior apartment by herself and uses a walker for ambulation.  She presented to the hospital with increasing shortness of breath and was noted to have a right-sided effusion bilateral right middle lobe consolidation with a small left lower lobe infiltrate.  She also was noted to have hypoxic respiratory failure and needed oxygen she was initially treated with Levaquin and advice for pulmonary follow-up with a repeat CT in 1 month.  Unfortunately she returned back to the hospital with recurrent shortness of breath and right-sided effusion.  A CT scan done on 10/24 showed that she had a right central lung mass and she had some lung nodules.  Patient underwent thoracenteses.  Imaging revealed posterior right upper lobe mass with satellite nodules with evidence of mediastinal adenopathy and right infraclavicular lymph nodes.  Subsequently a lymph node biopsy of her right infraclavicular lymph node revealed that she had metastatic adenocarcinoma.  She had oncology consultation and will need to see oncology regarding treatment options.  She is scheduled for a PET scan on 11/3/17 and will follow up with [facility] medical oncology as an outpatient she is denying any pain discomfort and seems to be otherwise doing well.  She remains short winded and is hypoxic.  Unfortunately she did not have a good visit with her oncologist and all her tumor markers came back as negative so she will be only a candidate for systemic chemotherapy which in light of her advanced age is not being recommended for her.  He does have an appointment to see surgery to see if she can have her right-sided pleural  effusion drained it appears to be loculated and subsequently of a Pleurx catheter placed  Oncology wants to see her back in 2 weeks at which time she can make a decision about going on hospice and she is requesting a transfer to another facility by the weekend    Past Medical History:   Diagnosis Date     Acute kidney injury 3/25/2016     Aortic regurgitation      Asthma      Breast cancer 1971     COPD (chronic obstructive pulmonary disease)     quit tobacco 1969     Diastolic CHF      GERD (gastroesophageal reflux disease)      Heart murmur     aortic regurgitation, mitral regurgitation     Hyperlipidemia      Hypertension      Hyponatremia     chronic     Lumbar stenosis      Mitral regurgitation      Osteoarthritis      Osteoporosis      Pulmonary hypertension      Past Surgical History:   Procedure Laterality Date     MASTECTOMY, RADICAL Bilateral 1971     OOPHORECTOMY      Description: Oophorectomy;  Recorded: 11/16/2008;     FL EXCISION SUBMAXILLARY GLAND      Description: Surgery Excision Of Submandibular (Submaxillary) Gland;  Recorded: 11/16/2008;     VERTEBROPLASTY  07/11/2014    for T12 compression fracture.     Family History   Problem Relation Age of Onset     No Medical Problems Mother      Ulcers Father      Stroke Father      Hypertension Sister      Alzheimer's disease Brother      Social History     Social History     Marital status:      Spouse name: N/A     Number of children: N/A     Years of education: N/A     Occupational History     Retired.      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     Social History Main Topics     Smoking status: Former Smoker     Quit date: 7/11/1965     Smokeless tobacco: Never Used     Alcohol use 0.6 oz/week     1 Cans of beer per week      Comment: 2 gin and tonics/day     Drug use: No     Sexual activity: Not on file     Other Topics Concern     Not on file     Social History Narrative    She is  twice.   Has 9 grandchildren.  None of them live in [place].  She lives in a senior living facility in [place] and is quite independent.  Granddaughter Carlita is very much involved.  She lives in [place].     Current Outpatient Prescriptions   Medication Sig Dispense Refill     acetaminophen (TYLENOL) 650 MG suppository Insert 1 suppository (650 mg total) into the rectum every 6 (six) hours as needed.  0     albuterol (PROAIR HFA) 90 mcg/actuation inhaler Inhale 1-2 puffs every 4 (four) hours as needed. Every 4-6 hours 1 Inhaler 0     aspirin 325 MG EC tablet Take 325 mg by mouth every 7 days.        calcium carbonate (OS-NICKIE) 600 mg (1,500 mg) tablet Take 600 mg by mouth 2 (two) times a day with meals.       cholecalciferol, vitamin D3, 1,000 unit tablet Take 2,000 Units by mouth 2 (two) times a day.        cloNIDine HCl (CATAPRES) 0.2 MG tablet Take 0.2 mg by mouth daily.       diltiazem (CARTIA XT) 240 MG 24 hr capsule Take 240 mg by mouth every evening.        fluticasone-salmeterol (ADVAIR) 500-50 mcg/dose DISKUS Inhale 1 puff 2 (two) times a day.       furosemide (LASIX) 20 MG tablet Take 20 mg by mouth daily.       ibuprofen (ADVIL,MOTRIN) 200 MG tablet Take 200 mg by mouth every 6 (six) hours as needed for pain.       ipratropium-albuterol (DUO-NEB) 0.5-2.5 mg/3 mL nebulizer Take 3 mL by nebulization every 6 (six) hours as needed. 25 vial 2     lidocaine 4 % patch Remove and discard patch with 12 hours or as directed by MD. 5 patch 0     losartan (COZAAR) 100 MG tablet Take 100 mg by mouth every evening.       magnesium oxide (MAG-OX) 400 mg tablet Take 400 mg by mouth daily.       melatonin 3 mg Tab tablet Take 6 mg by mouth at bedtime as needed.        multivitamin (MULTIVITAMIN) per tablet Take 1 tablet by mouth daily.       nebulizer and compressor Blanca duonebs 1 each 0     VIT A/VIT C/VIT E/ZINC/COPPER (ICAPS AREDS ORAL) Take 2 capsules by mouth daily.        No current facility-administered medications for  this visit.      Allergies   Allergen Reactions     Amlodipine Besylate Hives     Hydrochlorothiazide      Annotation: caused low sodium       Other Allergy (See Comments) Hives     Contrast Media Ready-Box MISC, 04/10/2012.       Penicillins Hives         Review of Systems:    Constitutional: Negative.  Negative for fever, chills, Has activity change, appetite change and fatigue.   HENT: Negative for congestion and facial swelling.   Has lost greater than 20 pounds recently which has been on and  Eyes: Negative for photophobia, redness and visual disturbance.   Respiratory: Negative for cough and chest tightness.    Has shortness of breath and is on oxygen  Cardiovascular: Negative for chest pain, palpitations and leg swelling.   Gastrointestinal: Negative for nausea, diarrhea, constipation, blood in stool and abdominal distention.   Genitourinary: Negative.    Musculoskeletal: Negative.  Generalized weakness uses a walker for ambulation  Skin: Negative.    Neurological: Negative for dizziness, tremors, syncope, weakness, light-headedness and headaches.   Hematological: Does not bruise/bleed easily.   Psychiatric/Behavioral: Negative.      Vitals:    11/09/17 1039   BP: 121/68   Pulse: 60   Temp: 98  F (36.7  C)       Physical Exam:    GENERAL: no acute distress. Cooperative in conversation.   HEENT: pupils are equal, round and reactive. Oral mucosa is moist and intact.  RESP:Chest symmetric. Regular respiratory rate. No stridor.  Currently on 2 L of oxygen by nasal cannula  Has significantly decreased breath sounds on the right side of her chest today.  CVS: S1S2  ABD: Nondistended, soft.  EXTREMITIES: No lower extremity edema.   NEURO: non focal. Alert and oriented x3.   PSYCH: within normal limits. No depression or anxiety.  SKIN: warm dry intact     Radiology  CXR R>L PLEURAL EFFUSIN  Nm Pet Ct Skull To Mid Thigh    Result Date: 11/3/2017  [facility] PET FDG/CT 11/3/2017 1:08 PM INDICATION: Lung  cancer, right, staging. Initial treatment strategy. TECHNIQUE: Serum glucose level 105 mg/dL. One hour post intravenous administration of 7.5 mCi F-18 FDG, PET imaging was performed from the skull base to the mid thighs utilizing attenuation correction with concurrent axial CT and PET/CT image fusion. Dose reduction techniques were used. COMPARISON: CT chest 10/25/2017 reviewed. FINDINGS: Irregular lobulated ill-defined mass in the posterior aspect right upper lobe measuring up to 4.9 cm, similar to 10/25/2017, demonstrates heterogeneous mild uptake (SUVmax 3.5), consistent with malignancy. FDG avid right perihilar adenopathy involves right interlobar station 11R (SUVmax 3.1) and right hilar station 10R regions. FDG avid mediastinal adenopathy involves lower paratracheal station 4. Contralateral left perihilar station 10L and 11L adenopathy also present with mild uptake. FDG avid right infraclavicular adenopathy measuring 1.4 x 2.4 cm mild uptake (SUVmax 3.6), consistent with biopsy-proven sharath metastasis. Mild pleural nodularity in the right lung base with moderate uptake (SUVmax 4.1). Large partially loculated right pleural effusion. Normal adrenal glands. Moderate generalized cerebral volume loss with associated ex vacuo ventricular dilatation. Moderate chronic small vessel ischemic changes in the periventricular white matter. Bandlike irregular opacities in the left lung base demonstrate only low-level uptake, likely infectious/inflammatory. Mild cardiac enlargement. Dense coronary artery calcification and/or stents. Marked atherosclerotic calcifications elsewhere. Bilateral mastectomies. Left renal cysts. Cholelithiasis. Calcified splenic granuloma. Pelvic pessary. Colonic diverticula. Bony demineralization. Marked scattered degenerative changes spine. Thoracic kyphosis. Thoracolumbar curve. Chronic severe compression fracture T12.     CONCLUSION: Findings suspicious for right upper lobe primary lung cancer  with metastases involving scattered thoracic lymph nodes as well as malignant right pleural involvement.      Assessment/Plan:    Metastatic adenocarcinoma of the lung stage IV.  Evaluated by medical oncology.  She wants to pursue medical treatment and has a plan to get a PET scan done this week after which he will be followed up in the clinic for consideration of chemotherapy  Hypoxic respiratory failure currently discharged on 2 L of oxygen by nasal cannula.  Prior history of COPD.  Chronic diastolic heart failure.  Currently on low-dose Lasix  History of malignant right-sided recurrent pleural effusions which have been recurrent and it has been felt that she may need a Pleurx catheter placed in the near future if her pleural effusion recurs status post thoracentesis.  Exam today highly suspicious for recurrence and I am going to get her stat x-ray chest done.  Hyponatremia.  Stable  HTN-on Cozaar clonidine as well as Lasix  History of aortic regurgitation.  GERD  Moderate amount of malnutrition due to poor intake with an unintentional weight loss recently of about 17-20 pounds she has been started on nutritional supplements over here most likely due to underlying history of cancer.  Debilitation in an elderly female at baseline we will uses a walker  Patient is here for PT OT and rehab.  We will try to wean her oxygen as much as possible she has been started on nutritional supplements also  She had  a PET scan and will closely follow-up with her medical oncologist for consideration of chemotherapy.  She plans to see them tomorrow to discuss treatment options in the meantime will follow-up on the results of the x-ray I have a suspicion she may need a Pleurx catheter if her pleural effusion is any worse.  [facility] 17/30  Patient unfortunately did not have a good visit with oncology all her tumor markers came back negative.  There is no option for her to try targeted therapies which was or what was hope was.  She  unfortunately is not a great candidate for systemic chemotherapy which remains her only option  She will see general surgery regarding her recurrent loculated pleural effusion to see if they can break it and subsequently inserted a Pleurx catheter which will significantly improve the quality of her life and minimize her hypoxia oncology wants to see her back in 2 weeks at which time she will make a decision of hospice versus pursuing some kind of chemotherapy palliative or  Otherwise  She wants to transfer to another nursing facility and paperwork will be signed today  Total time spent was 35 minutes, more than half of it was in face-to-face counseling regarding disease state, treatment, side effects, documentation, review of clinical data and coordination of care    Electronically signed by: KIM Phillip  This progress note was completed using Dragon software and there may be grammatical errors.

## 2021-06-14 NOTE — PROGRESS NOTES
[facility]      NAME:  Susu Pinedo             :  1925  MRN: [id number]  CODE STATUS:  DNR    VISIT TYPE: DISCHARGE SUMMARY  FACILYTY: [facility] [[id number]]     HOSPITALIZATION:  [facility] 10/24/17 to 10/31/17               PRIMARY CARE PROVIDER: Raúl Espitia MD    DISCHARGE DIAGNOSIS:      1. Chronic obstructive pulmonary disease, unspecified COPD type    2. CHF (congestive heart failure)    3. Malignant pleural effusion         DISCHARGE MEDICATIONS:         Medication List          These changes are accurate as of: 11/10/17  4:09 PM.  If you have any questions, ask your nurse or doctor.               CONTINUE taking these medications          albuterol 90 mcg/actuation inhaler   Commonly known as:  PROAIR HFA   Inhale 1-2 puffs every 4 (four) hours as needed. Every 4-6 hours       aspirin 325 MG EC tablet       calcium carbonate 600 mg calcium (1,500 mg) tablet   Commonly known as:  OS-NICKIE       CARTIA  MG 24 hr capsule   Generic drug:  diltiazem       cholecalciferol (vitamin D3) 1,000 unit tablet       cloNIDine HCl 0.2 MG tablet   Commonly known as:  CATAPRES       fluticasone-salmeterol 500-50 mcg/dose DISKUS   Commonly known as:  ADVAIR       furosemide 20 MG tablet   Commonly known as:  LASIX       ibuprofen 200 MG tablet   Commonly known as:  ADVIL,MOTRIN       ICAPS AREDS ORAL       ipratropium-albuterol 0.5-2.5 mg/3 mL nebulizer   Commonly known as:  DUO-NEB   Take 3 mL by nebulization every 6 (six) hours as needed.       lidocaine 4 % patch   Remove and discard patch with 12 hours or as directed by MD.       losartan 100 MG tablet   Commonly known as:  COZAAR       magnesium oxide 400 mg tablet   Commonly known as:  MAG-OX       melatonin 3 mg Tab tablet       multivitamin per tablet   Generic drug:  multivitamin       nebulizer and compressor Blanca bonilla         STOP taking these medications          acetaminophen 650 MG suppository    Commonly known as:  TYLENOL   Stopped by:  Sarika Hoang CNP             HISTORY OF PRESENT ILLNESS: Susu Pinedo is a 89 y.o. female BEING SEEN FOR A d/c. Her plans [facility] another [facility] has recently been diagnosed with   Adenocarcinoma of lung, stage 4, right sided.  This was discovered after a workup when she went into [facility] with an increased shortness of breath.  She had been living at a senior living apartment and was quite ambulatory with a walker.  Past medical history also includes: Aortic Regurgitation, Chronic obstructive pulmonary disease, unspecified COPD type, Gastroesophageal reflux disease without esophagitis   Essential hypertension, Chronic diastolic heart failure and HTN. She is seen in her room with oxygen in place sitting in a chair. Does get SOB with conversations.We discussed her d/c plans and the SW was to confirm her placement for Sunday 11/12.   .      PHYSICAL EXAMINATION:    Vitals:    11/10/17 1558   BP: 143/61   Pulse: 60   Temp: 98.7  F (37.1  C)   Weight: 101 lb 11.2 oz (46.1 kg)           GENERAL: Awake, Alert, oriented x3, not in any form of acute distress, answers questions appropriately, follows simple commands, conversant  HEENT: Head is normocephalic with normal hair distribution. No evidence of trauma. Ears: No acute purulent discharge. Eyes: Conjunctivae pink with no scleral jaundice. Nose: Normal mucosa and septum. NECK: Supple with no cervical or supraclavicular lymphadenopathy. Trachea is midline.   CHEST: No tenderness or deformity, no crepitus  LUNG: DM to auscultation  On R with good chest expansion. There are right sided  crackles throughout, normal AP diameter.  BACK: No kyphosis of the thoracic spine. Symmetric, no curvature, ROM normal, no CVA tenderness, no spinal tenderness   CVS: There is good S1  S2, there are no murmurs, rubs, gallops, or heaves, rhythm is regular.  ABDOMEN: Globular and soft, nontender to palpation, non distended, no masses,  no organomegaly, good bowel sounds, no rebound or guarding, no peritoneal signs.   EXTREMITIES: Atraumatic. Full range of motion on both upper and lower extremities, there is no tenderness to palpation, no pedal edema, no cyanosis or clubbing, no calf tenderness, normal cap refill, no joint swelling.  SKIN: Warm and dry, no erythema noted, no rashes or lesions.  NEUROLOGICAL: The patient is oriented to person, place and time. Strength and sensation are grossly intact. Face is symmetric.    LABS:  All labs reviewed in the nursing home record.    PLAN:   Susu  Is d/c to another SNF.  Orders have been sent for medication and orders.      Electronically signed by:  Sarika Hoang CNP  This progress note was completed using Dragon software and there may be grammatical errors.      For documentation purposes, chart review, medication management, and discharge coordination of care was greater than 35 minutes

## 2021-06-14 NOTE — PROGRESS NOTES
[facility]      Code Status:  DNR  Visit Type: Review Of Multiple Medical Conditions     Facility:  [facility] [[id number]]           History of Present Illness: Susu Pinedo is a 89 y.o. female who is currently admitted to the TCU as a transfer from the hospital.  This is a 89-year-old who lives in a senior apartment by herself and uses a walker for ambulation.  She presented to the hospital with increasing shortness of breath and was noted to have a right-sided effusion bilateral right middle lobe consolidation with a small left lower lobe infiltrate.  She also was noted to have hypoxic respiratory failure and needed oxygen she was initially treated with Levaquin and advice for pulmonary follow-up with a repeat CT in 1 month.  Unfortunately she returned back to the hospital with recurrent shortness of breath and right-sided effusion.  A CT scan done on 10/24 showed that she had a right central lung mass and she had some lung nodules.  Patient underwent thoracenteses.  Imaging revealed posterior right upper lobe mass with satellite nodules with evidence of mediastinal adenopathy and right infraclavicular lymph nodes.  Subsequently a lymph node biopsy of her right infraclavicular lymph node revealed that she had metastatic adenocarcinoma.  She had oncology consultation and will need to see oncology regarding treatment options.  She is scheduled for a PET scan on 11/3/17 and will follow up with [facility] medical oncology as an outpatient she is denying any pain discomfort and seems to be otherwise doing well.  She remains short winded and is hypoxic.  Unfortunately she did not have a good visit with her oncologist and all her tumor markers came back as negative so she will be only a candidate for systemic chemotherapy which in light of her advanced age is not being recommended for her.  sHe does have an appointment to see surgery tomorrow to see if she can have her right-sided  pleural effusion drained it appears to be loculated and subsequently of a Pleurx catheter placed  Oncology wants to see her back in 2 weeks at which time she can make a decision about going on hospice and she is requesting a transfer to another facility today    Past Medical History:   Diagnosis Date     Acute kidney injury 3/25/2016     Aortic regurgitation      Asthma      Breast cancer 1971     COPD (chronic obstructive pulmonary disease)     quit tobacco 1969     Diastolic CHF      GERD (gastroesophageal reflux disease)      Heart murmur     aortic regurgitation, mitral regurgitation     Hyperlipidemia      Hypertension      Hyponatremia     chronic     Lumbar stenosis      Mitral regurgitation      Osteoarthritis      Osteoporosis      Pulmonary hypertension      Past Surgical History:   Procedure Laterality Date     MASTECTOMY, RADICAL Bilateral 1971     OOPHORECTOMY      Description: Oophorectomy;  Recorded: 11/16/2008;     [place] EXCISION SUBMAXILLARY GLAND      Description: Surgery Excision Of Submandibular (Submaxillary) Gland;  Recorded: 11/16/2008;     VERTEBROPLASTY  07/11/2014    for T12 compression fracture.     Family History   Problem Relation Age of Onset     No Medical Problems Mother      Ulcers Father      Stroke Father      Hypertension Sister      Alzheimer's disease Brother      Social History     Social History     Marital status:      Spouse name: N/A     Number of children: N/A     Years of education: N/A     Occupational History     Retired.      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     Social History Main Topics     Smoking status: Former Smoker     Quit date: 7/11/1965     Smokeless tobacco: Never Used     Alcohol use 0.6 oz/week     1 Cans of beer per week      Comment: 2 gin and tonics/day     Drug use: No     Sexual activity: Not on file     Other Topics Concern     Not on file     Social History Narrative    She is  twice.   Has 9 grandchildren.  None of them live in [place].  She lives in a senior living facility in [place] and is quite independent.  Granddaughter Carlita is very much involved.  She lives in [place].     Current Outpatient Prescriptions   Medication Sig Dispense Refill     albuterol (PROAIR HFA) 90 mcg/actuation inhaler Inhale 1-2 puffs every 4 (four) hours as needed. Every 4-6 hours 1 Inhaler 0     aspirin 325 MG EC tablet Take 325 mg by mouth every 7 days.        calcium carbonate (OS-NICKIE) 600 mg (1,500 mg) tablet Take 600 mg by mouth 2 (two) times a day with meals.       cholecalciferol, vitamin D3, 1,000 unit tablet Take 2,000 Units by mouth 2 (two) times a day.        cloNIDine HCl (CATAPRES) 0.2 MG tablet Take 0.2 mg by mouth daily.       diltiazem (CARTIA XT) 240 MG 24 hr capsule Take 240 mg by mouth every evening.        fluticasone-salmeterol (ADVAIR) 500-50 mcg/dose DISKUS Inhale 1 puff 2 (two) times a day.       furosemide (LASIX) 20 MG tablet Take 20 mg by mouth daily.       ibuprofen (ADVIL,MOTRIN) 200 MG tablet Take 200 mg by mouth every 6 (six) hours as needed for pain.       ipratropium-albuterol (DUO-NEB) 0.5-2.5 mg/3 mL nebulizer Take 3 mL by nebulization every 6 (six) hours as needed. 25 vial 2     lidocaine 4 % patch Remove and discard patch with 12 hours or as directed by MD. 5 patch 0     losartan (COZAAR) 100 MG tablet Take 100 mg by mouth every evening.       magnesium oxide (MAG-OX) 400 mg tablet Take 400 mg by mouth daily.       melatonin 3 mg Tab tablet Take 6 mg by mouth at bedtime as needed.        multivitamin (MULTIVITAMIN) per tablet Take 1 tablet by mouth daily.       nebulizer and compressor Blanca duonebs 1 each 0     traZODone (DESYREL) 50 MG tablet Take 50 mg by mouth at bedtime as needed for sleep.       VIT A/VIT C/VIT E/ZINC/COPPER (ICAPS AREDS ORAL) Take 2 capsules by mouth daily.        No current facility-administered medications for this visit.      Allergies   Allergen  Reactions     Amlodipine Besylate Hives     Hydrochlorothiazide      Annotation: caused low sodium       Other Allergy (See Comments) Hives     Contrast Media Ready-Box MISC, 04/10/2012.       Penicillins Hives         Review of Systems:    Constitutional: Negative.  Negative for fever, chills, Has activity change, appetite change and fatigue.   HENT: Negative for congestion and facial swelling.   Has lost greater than 20 pounds recently which has been on and  Eyes: Negative for photophobia, redness and visual disturbance.   Respiratory: Negative for cough and chest tightness.    Has shortness of breath and is on oxygen  Cardiovascular: Negative for chest pain, palpitations and leg swelling.   Gastrointestinal: Negative for nausea, diarrhea, constipation, blood in stool and abdominal distention.   Genitourinary: Negative.    Musculoskeletal: Negative.  Generalized weakness uses a walker for ambulation  Skin: Negative.    Neurological: Negative for dizziness, tremors, syncope, weakness, light-headedness and headaches.   Hematological: Does not bruise/bleed easily.   Psychiatric/Behavioral: Negative.      bp 120/65 p90 t98    Physical Exam:    GENERAL: no acute distress. Cooperative in conversation.   HEENT: pupils are equal, round and reactive. Oral mucosa is moist and intact.  RESP:Chest symmetric. Regular respiratory rate. No stridor.  Currently on 2 L of oxygen by nasal cannula  Has significantly decreased breath sounds on the right side of her chest today.  CVS: S1S2  ABD: Nondistended, soft.  EXTREMITIES: No lower extremity edema.   NEURO: non focal. Alert and oriented x3.   PSYCH: within normal limits. No depression or anxiety.  SKIN: warm dry intact     Radiology  CXR R>L PLEURAL EFFUSIN  Nm Pet Ct Skull To Mid Thigh    Result Date: 11/3/2017  [facility] PET FDG/CT 11/3/2017 1:08 PM INDICATION: Lung cancer, right, staging. Initial treatment strategy. TECHNIQUE: Serum glucose level 105 mg/dL. One hour  post intravenous administration of 7.5 mCi F-18 FDG, PET imaging was performed from the skull base to the mid thighs utilizing attenuation correction with concurrent axial CT and PET/CT image fusion. Dose reduction techniques were used. COMPARISON: CT chest 10/25/2017 reviewed. FINDINGS: Irregular lobulated ill-defined mass in the posterior aspect right upper lobe measuring up to 4.9 cm, similar to 10/25/2017, demonstrates heterogeneous mild uptake (SUVmax 3.5), consistent with malignancy. FDG avid right perihilar adenopathy involves right interlobar station 11R (SUVmax 3.1) and right hilar station 10R regions. FDG avid mediastinal adenopathy involves lower paratracheal station 4. Contralateral left perihilar station 10L and 11L adenopathy also present with mild uptake. FDG avid right infraclavicular adenopathy measuring 1.4 x 2.4 cm mild uptake (SUVmax 3.6), consistent with biopsy-proven sharath metastasis. Mild pleural nodularity in the right lung base with moderate uptake (SUVmax 4.1). Large partially loculated right pleural effusion. Normal adrenal glands. Moderate generalized cerebral volume loss with associated ex vacuo ventricular dilatation. Moderate chronic small vessel ischemic changes in the periventricular white matter. Bandlike irregular opacities in the left lung base demonstrate only low-level uptake, likely infectious/inflammatory. Mild cardiac enlargement. Dense coronary artery calcification and/or stents. Marked atherosclerotic calcifications elsewhere. Bilateral mastectomies. Left renal cysts. Cholelithiasis. Calcified splenic granuloma. Pelvic pessary. Colonic diverticula. Bony demineralization. Marked scattered degenerative changes spine. Thoracic kyphosis. Thoracolumbar curve. Chronic severe compression fracture T12.     CONCLUSION: Findings suspicious for right upper lobe primary lung cancer with metastases involving scattered thoracic lymph nodes as well as malignant right pleural  involvement.      Assessment/Plan:    Metastatic adenocarcinoma of the lung stage IV.  Evaluated by medical oncology.  She wants to pursue medical treatment and has a plan to get a PET scan done this week after which he will be followed up in the clinic for consideration of chemotherapy  Hypoxic respiratory failure currently discharged on 2 L of oxygen by nasal cannula.  Prior history of COPD.  Chronic diastolic heart failure.  Currently on low-dose Lasix  History of malignant right-sided recurrent pleural effusions which have been recurrent and it has been felt that she may need a Pleurx catheter placed in the near future if her pleural effusion recurs status post thoracentesis.  Sees surgery  Tomorrow for consultation.  HTN-on Cozaar clonidine as well as Lasix  History of aortic regurgitation.  GERD  Moderate amount of malnutrition due to poor intake with an unintentional weight loss recently of about 17-20 pounds she has been started on nutritional supplements over here most likely due to underlying history of cancer.  Debilitation in an elderly female at baseline we will uses a walker    She had  a PET scan and will closely follow-up with her medical oncologist for consideration of chemotherapy. Not a good candidate but pt wants to pursue chemo and not go on hospice yet  [facility] 17/30  Patient unfortunately did not have a good visit with oncology all her tumor markers came back negative.  There is no option for her to try targeted therapies which was or what was hope was.  She unfortunately is not a great candidate for systemic chemotherapy which remains her only option  She will see general surgery regarding her recurrent loculated pleural effusion to see if they can break it and subsequently inserted a Pleurx catheter which will significantly improve the quality of her life and minimize her hypoxia oncology wants to see her back in 2 weeks at which time she will make a decision of hospice versus pursuing some  kind of chemotherapy palliative or  Otherwise  She wants to transfer to another nursing facility and paperwork will be signed today      Electronically signed by: KIM Phillip  This progress note was completed using Dragon software and there may be grammatical errors.

## 2021-06-14 NOTE — PROGRESS NOTES
[facility] Hematology and Oncology Progress Note    Patient: Susu Pinedo  MRN: [id number]  Date of Service: 11/08/2017        Reason for Visit    Follow-up regarding adenocarcinoma of the lung.    Assessment and Plan  Adenocarcinoma of lung, stage 4, right    Staging form: Lung, AJCC 7th Edition    - Clinical: Stage IV (T2a, N3, M1a) - Signed by Marjorie Santiago MD on 11/8/2017    ECOG Performance   ECOG Performance Status: 2: In fact I think that her ECOG performance status is 3 rather than 2.    Distress Assessment  Distress Assessment Score: 1: Please see the discussion below.    Pain   : None.    Ms. Susu Pinedo is a 89 y.o. woman who was admitted to [facility] with recurrent right pleural effusion from 10/24/17 to 10/31/17.  Adenocarcinoma of the right lung was diagnosed based on FNA from the right infraclavicular lymph node done on 10/26/17 and also from the cytology of the pleural effusion.  He has a history of bilateral breast cancer for which she had lateral mastectomies done in 1971.  She also has a history of COPD and CHF due to mitral and aortic regurgitation.    1.  I reviewed the images of the PET CT scan from 11/3/17 with her.  It shows the right upper lobe primary lung cancer, involvement of mediastinal lymph nodes and the malignant appearing right pleural effusion.  In the PET CT scan the pleural effusion appears at least partially loculated.    2.  I discussed with the patient that the testing done for EGFR, ALK and ROS 1 mutations came back negative.  PDL 1 staining came back at 0.  Thus we are not really finding a target for using one of the newer targeted therapies.  In this situation with the stage IV disease we can only consider systemic chemotherapy.  I discussed with her that she is quite weak and with her comorbidities I am not quite sure that systemic chemotherapy would be beneficial for her.  In fact it may actually make her quality of life worse without giving her any benefit  in terms of how long she lives.  I advised her that if I were in her position I would be looking more to hospice then chemotherapy.  This is very difficult for the patient to care.  She says that this was not what she wanted to hear.  She would like to think about chemotherapy still.  I discussed with her that we can try to do something to try to make her breathing better.  That will give her some more time to think and then we can make a decision regarding chemotherapy.  Certainly we cannot be very aggressive with chemotherapy with her performance status.  She agreed to the plan.    3.  Her main concern at this point is shortness of breath.  She is short of breath even at rest.  The main component of the shortness of breath appears to be from the right-sided pleural effusion.  We had considered doing a Pleurx catheter.  However in the PET CT scan the pleural effusion appears to be at least partially loculated.  I did discuss with the radiologist and they think that it may be difficult to put in a Pleurx catheter through IR because of the location.  I recommended that she meet with Dr. Lee of surgery to see whether he can break the loculations and drain the pleural effusion.  After that may be he will be able to do a pleurodesis or put in the Pleurx catheter for long-term treatment.  I am hoping that this will improve her breathing and give her some symptomatic benefit.    4.  I asked her to come back and see me in 2 weeks after the above surgery consultation.  Hopefully by then we can make a final decision regarding chemotherapy versus hospice.    Time spend >40 minutes face to face with the patient. More than 50 % in counseling and coordination of care.      Problem List    1. Adenocarcinoma of lung, stage 4, right  Ambulatory referral to General Surgery   2. Malignant pleural effusion  Ambulatory referral to General Surgery        CC: MD Nadine Hayden,  MD    ______________________________________________________________________________    History of Present Illness    Ms. Susu Pinedo is here for follow-up.  She is accompanied by Mr. Keith Dorcas.  He was her neighbor for a 17 years.  He will be reporting back to her granddaughter and the rest of the family.    [facility] is now in [facility].  She feels subjectively that difficulty breathing remains about the same.  She continues on nasal oxygen.  She admits that she has some difficulty sleeping at night because of the shortness of breath.  She has to be kept propped up.  She admits that she has significant weakness.  She finds it difficult to walk.  With physical therapy she was able to walk for about 150 feet.  There is a maximum that she has been able to do.    Appetite is not great.  She does not have a cough.  She is not complaining of any pain anywhere.    No fevers or night sweats.  No new lumps or bumps anywhere.  No unusual headaches.  No eyesight problems.  Hearing is poor.  No nausea or vomiting or abdominal pain.  No constipation or diarrhea.  No difficulty passing urine.  No bleeding from any site.    Please see below.  A 14 point review of system is otherwise completely negative.    Pain Status  Currently in Pain: No/denies    Review of Systems    Constitutional  Fatigue: Fatigue relieved by rest  Fever: None  Chills: Mild sensation of cold, shivering, chattering of teeth (feels cold)  Weight Gain: None  Weight Loss: None  Neurosensory  Neurosensory (WDL): Exceptions to WDL  Peripheral Motor Neuropathy: None  Ataxia: Asymptomatic, clinical or diagnostic observations only, intervention not indicated  Peripheral Sensory Neuropathy: None  Confusion: None  Syncope: None  Cardiovascular  Cardiovascular (WDL): Exceptions to WDL  Palpitations: Definition: A disorder characterized by inflammation of the muscle tissue of the heart. (occ )  Edema: Yes  Edema Limbs: 5 - 10% inter-limb discrepancy in  volume or circumference at point of greatest visible difference, swelling or obscuration of anatomic architecture on close inspection (Rt > Rt lower leg)  Phlebitis: None  Superficial thrombophlebitis: None  Pulmonary  Respiratory (WDL): Exceptions to WDL  Cough: None  Dyspnea: Shortness of breath with minimal exertion, limiting instrumental ADL  Hypoxia: Decreased oxygen saturation with exercise (e.g., pulse oximeter <88%), intermittent supplemental oxygen (on 2-3 L/NC)  Gastrointestinal  Gastrointestinal (WDL): Exceptions to WDL  Anorexia: None  Constipation: None  Diarrhea: None  Dysphagia: None  Esophagitis: None  Nausea: None  Pharyngitis: None  Vomiting: None  Dysgeusia: None  Dry Mouth: Symptomatic (e.g., dry or thick saliva) without significant dietary alteration, unstimulated saliva flow >0.2 ml/min  Genitourinary  Genitourinary (WDL): Exceptions to WDL (difficulty starting stream, has pessary in place)  Urinary Frequency: None  Urinary Retention: None  Urinary Tract Pain: None  Integumentary  Integumentary (WDL): Exceptions to WDL (bruising, fragile skin)  Patient Coping  Patient Coping: Open/discussion  Distress Assessment  Distress Assessment Score: 1  Accompanied by  Accompanied by: Friend    Past History  Past Medical History:   Diagnosis Date     Acute kidney injury 3/25/2016     Aortic regurgitation      Asthma      Breast cancer 1971     COPD (chronic obstructive pulmonary disease)     quit tobacco 1969     Diastolic CHF      GERD (gastroesophageal reflux disease)      Heart murmur     aortic regurgitation, mitral regurgitation     Hyperlipidemia      Hypertension      Hyponatremia     chronic     Lumbar stenosis      Mitral regurgitation      Osteoarthritis      Osteoporosis      Pulmonary hypertension          Past Surgical History:   Procedure Laterality Date     MASTECTOMY, RADICAL Bilateral 1971     OOPHORECTOMY      Description: Oophorectomy;  Recorded: 11/16/2008;     NM EXCISION SUBMAXILLARY  GLAND      Description: Surgery Excision Of Submandibular (Submaxillary) Gland;  Recorded: 11/16/2008;     VERTEBROPLASTY  07/11/2014    for T12 compression fracture.       Physical Exam    Recent Vitals 11/8/2017   Height -   Weight -   BSA (m2) -   /75   Pulse 88   Temp 98.6   Temp src 1   SpO2 91   Some recent data might be hidden       GENERAL: Alert and oriented.  She is seated in a wheelchair.  She is not in any acute distress.  She has oxygen by nasal cannula.  She has pursed lip breathing.  She looks tired and chronically ill.    HEAD: Atraumatic and normocephalic.  Has a full head of hair.    EYES: DEEPALI, EOMI.  Mild pallor.  No icterus.    Oral cavity: no mucosal lesion or tonsillar enlargement.    NECK: supple. JVP normal.  No thyroid enlargement.    LYMPH NODES: No palpable, cervical, axillary or inguinal lymphadenopathy.    CHEST: The left chest is clear to auscultation.  On the right side I do not hear any adventitious sounds.  However she has no breath sounds in the right intrascapular area.  The right intrascapular area is dull to percussion.  Left chest most much better on breathing than the right side.    CVS: S1 and S2 are heard. Regular rate and rhythm.  No murmur or gallop or rub heard.    ABDOMEN: Soft. Not tender. Not distended.  No palpable hepatomegaly or splenomegaly.  No other mass palpable.  Bowel sounds heard.    EXTREMITIES: Warm.  Trace ankle edema bilaterally.    SKIN: no rash, or bruising or purpura.        Lab Results    Recent Results (from the past 168 hour(s))   POCT Glucose   Result Value Ref Range    Glucose,  mg/dL       Imaging    Xr Chest Ap Portable    Result Date: 10/30/2017  XR CHEST AP PORTABLE 10/30/2017 10:38 AM INDICATION: pleural effusion follow up COMPARISON: 10/24/2017 FINDINGS: Increase in interstitial prominence raises question of increasing interstitial edema. Blunting of both costophrenic angles unchanged. No change in the infiltrate or atelectasis  present right base. Modest cardiomegaly unchanged. Scoliosis.    Xr Chest Ap Portable    Result Date: 10/13/2017  XR CHEST AP PORTABLE 10/13/2017 2:16 PM INDICATION: sob COMPARISON: 10/25/2016 FINDINGS: Lung volumes are much lower. There are small bilateral pleural effusions and appears new as well and diffuse interstitial prominence. Moderate cardiomegaly unchanged. Findings most suggestive of CHF exacerbation.    Xr Chest Pa And Lateral    Result Date: 10/24/2017  XR CHEST PA AND LATERAL 10/24/2017 1:29 PM INDICATION: sob COMPARISON: 10/15/2017 FINDINGS: Minimal change. Small right pleural effusion persists and there is likely partial atelectasis or consolidation involving right lung base. Left lung remains clear. Heart size likely upper limits of normal. Tortuous calcified thoracic aorta unchanged. Osteopenia, scoliosis and kyphosis.    Ct Chest Without Contrast    Result Date: 10/25/2017  CT CHEST WO CONTRAST 10/25/2017 3:29 PM INDICATION: Pleural effusion. Follow up after thoracentesis. TECHNIQUE: Routine chest. Dose reduction techniques were used. IV CONTRAST: None. COMPARISON: 10/24/2017. 10/14/2017. FINDINGS: LUNGS AND PLEURA: Posterior-medial right upper lobe irregular mass like opacity measuring 4.3 x 4.9 x 4.6 cm (series 2, image 33). Finding extends towards the right suprahilar region. Small surrounding satellite nodules and mild thickening. No significant change of irregular nodular opacity in the left lower lobe measuring 2.1 x 2.5 cm (image 57) and additional clustered left lower lobe nodules up to roughly 0.7 cm (image 55). Improved but persistent small right pleural effusion. Minimal pleural fluid. No pneumothorax. MEDIASTINUM: Questionable enlarged right infraclavicular node measuring 1.1 x 1.7 cm (series 2, image 10). Mediastinal adenopathy with example precarinal node measuring 1.1 x 1.6 cm brain image 36). Right hilar adenopathy better seen on prior contrast-enhanced CT, though likely not  substantially changed. Trace pericardial effusion. Stable cardiomegaly. Severe pulmonary trunk enlargement measuring 4.3 cm. Severe coronary, mitral annular and aortic calcifications. Ectatic thoracic aorta. LIMITED UPPER ABDOMEN: Severe vascular calcifications. Left renal hypo- and hyperdensities. MUSCULOSKELETAL: Diffuse bony demineralization. Stable severe T12 compression fracture. Stable focal sclerosis within T6.     CONCLUSION: 1.  Posterior-medial right upper lobe masslike consolidation. Finding is worrisome for neoplasm, particularly if no infectious / inflammatory symptoms are present. If clinical concern for masslike pneumonia, short-term follow-up could be performed. Surrounding satellite nodules and thickening are present. 2.  No significant change of irregular left lower lobe consolidation and surrounding nodules. 3.  Mild mediastinal and right hilar adenopathy without significant change. Question of additional enlarged right infraclavicular node, though not well evaluated. 4.  Improved but persistent small right pleural effusion. Tiny left pleural effusion. 5.  Severely enlarged pulmonary trunk suggesting pulmonary hypertension. 6.  Small left renal hypo- and hyperdensities, possibly simple and complex cysts, though not well evaluated. Renal ultrasound could be performed. NOTE: ABNORMAL REPORT THE DICTATION ABOVE DESCRIBES AN ABNORMALITY FOR WHICH FOLLOW-UP IS NEEDED.     Ct Chest Without Contrast    Result Date: 10/24/2017  CT CHEST WO CONTRAST 10/24/2017 3:43 PM INDICATION: Dyspnea chronic, had xray TECHNIQUE: Routine chest. Dose reduction techniques were used. IV CONTRAST: None COMPARISON: 10/14/2017 FINDINGS: LUNGS AND PLEURA: Moderate size recurrent right pleural effusion. There may be some nodularity involving the posterior inferior pleural space. 3 x 4 cm central zone of apparent soft tissue consolidation best seen on image 40 indeterminate but could represent a central neoplasm. Irregular  nodular foci posterior left lung base unchanged are indeterminate though favored to be inflammatory. MEDIASTINUM: Heavy atherosclerotic calcifications of aorta and coronary arteries. No definite lymphadenopathy. LIMITED UPPER ABDOMEN: Atherosclerotic calcifications. MUSCULOSKELETAL: Severe compression fracture involving T12 with focal scoliosis and kyphosis unchanged     CONCLUSION: 1.  Recurrent right pleural effusion is nearly the same size as prior study even with an interval thoracentesis. 2.  Probable central right lung mass and indeterminate nodules at left lung base. Recommend follow-up chest CT with contrast after repeat right thoracentesis for best evaluation.    Xr Chest Lateral Decubitus Right    Result Date: 10/15/2017  XR CHEST LATERAL DECUBITUS RIGHT 10/15/2017 11:40 AM INDICATION: evaluate plueural effision COMPARISON: 10/13/2017 and 10/25/2016 FINDINGS: Moderate right pleural effusion slightly increased since 10/13/2017. Tiny left effusion unchanged. Tortuous thoracic aorta. Heart size mildly enlarged.    Xr Swallow Study W Speech    Result Date: 10/18/2017  XR SWALLOW STUDY W SPEECH 10/18/2017 1:51 PM INDICATION: Dysphagia. TECHNIQUE: Routine. COMPARISON: None. FINDINGS: FLUOROSCOPIC TIME: .6 minutes NUMBER OF IMAGES: 0 Swallow study with Speech Pathology using multiple barium thicknesses. No penetration or aspiration with all barium consistencies. Mildly prominent cricopharyngeus muscle.     CONCLUSION: 1.  No increased risk for aspiration.     Cta Chest Pe Run    Result Date: 10/14/2017  CTA CHEST PE RUN 10/14/2017, 6:15 AM INDICATION: Short of breath. TECHNIQUE: Helical acquisition through the chest was performed during the arterial phase of contrast enhancement using IV contrast. 2D and 3D reconstructions were performed by the CT technologist. Dose reduction techniques were used. IV CONTRAST: 12 hour pre-med for contrast allergy. COMPARISON: None. FINDINGS: ANGIOGRAM CHEST: Negative for  pulmonary emboli. Negative for thoracic aortic dissection and aneurysms. LUNGS AND PLEURA: No pneumothorax. Moderate right pleural effusion. Right lower lung compressive atelectasis. Small amount of left lower lobe infiltrate. Small area consolidation right middle lobe. Faint nodular areas both upper lobes and left lower lobe. MEDIASTINUM: Pulmonary artery is enlarged. No pericardial effusion. Nonspecific right hilar adenopathy measures 1.8 x 1.5 cm. Nonspecific middle mediastinal lymph nodes. Nonspecific left hilar lymph node. LIMITED UPPER ABDOMEN: Atherosclerosis involving the upper abdominal aorta. Left renal cysts suspected. MUSCULOSKELETAL: Severe compression deformity T11 level. Multilevel thoracic degenerative changes.     CONCLUSION: 1.  No aneurysm or dissection involving the thoracic aorta. 2.  No CT evidence of pulmonary emboli. 3.  Nonspecific right hilar adenopathy. 4.  Moderate right pleural effusion. 5.  Pulmonary hypertension. 6.  Atherosclerosis. 7.  Small amount of left lower lobe infiltrate. 8.  Minimal bilateral areas of nodularity which could be inflammatory or neoplastic.     Nm Pet Ct Skull To Mid Thigh    Result Date: 11/3/2017  [facility] PET FDG/CT 11/3/2017 1:08 PM INDICATION: Lung cancer, right, staging. Initial treatment strategy. TECHNIQUE: Serum glucose level 105 mg/dL. One hour post intravenous administration of 7.5 mCi F-18 FDG, PET imaging was performed from the skull base to the mid thighs utilizing attenuation correction with concurrent axial CT and PET/CT image fusion. Dose reduction techniques were used. COMPARISON: CT chest 10/25/2017 reviewed. FINDINGS: Irregular lobulated ill-defined mass in the posterior aspect right upper lobe measuring up to 4.9 cm, similar to 10/25/2017, demonstrates heterogeneous mild uptake (SUVmax 3.5), consistent with malignancy. FDG avid right perihilar adenopathy involves right interlobar station 11R (SUVmax 3.1) and right hilar station  10R regions. FDG avid mediastinal adenopathy involves lower paratracheal station 4. Contralateral left perihilar station 10L and 11L adenopathy also present with mild uptake. FDG avid right infraclavicular adenopathy measuring 1.4 x 2.4 cm mild uptake (SUVmax 3.6), consistent with biopsy-proven sharath metastasis. Mild pleural nodularity in the right lung base with moderate uptake (SUVmax 4.1). Large partially loculated right pleural effusion. Normal adrenal glands. Moderate generalized cerebral volume loss with associated ex vacuo ventricular dilatation. Moderate chronic small vessel ischemic changes in the periventricular white matter. Bandlike irregular opacities in the left lung base demonstrate only low-level uptake, likely infectious/inflammatory. Mild cardiac enlargement. Dense coronary artery calcification and/or stents. Marked atherosclerotic calcifications elsewhere. Bilateral mastectomies. Left renal cysts. Cholelithiasis. Calcified splenic granuloma. Pelvic pessary. Colonic diverticula. Bony demineralization. Marked scattered degenerative changes spine. Thoracic kyphosis. Thoracolumbar curve. Chronic severe compression fracture T12.     CONCLUSION: Findings suspicious for right upper lobe primary lung cancer with metastases involving scattered thoracic lymph nodes as well as malignant right pleural involvement.    Us Lymph Node Biopsy    Result Date: 10/26/2017  1.  FINE-NEEDLE ASPIRATION RIGHT INFRACLAVICULAR LYMPH NODE 2.  PERCUTANEOUS BIOPSY RIGHT INFRACLAVICULAR LYMPH NODE 3.  ULTRASOUND GUIDANCE 10/26/2017 4:00 PM INDICATION: Indeterminate right infraclavicular lymph node in patient with right upper lobe lung mass. PROCEDURE: Informed consent obtained. Time out performed. The site was prepped and draped in sterile fashion. 10 mL of 1% lidocaine was infused into the local soft tissues. Under direct ultrasound guidance, multiple fine-needle aspirates of the nodule were obtained. Next two 18-gauge  percutaneous core biopsies obtained. The tissue was felt to be adequate by pathology. RADIOLOGIC SUPERVISION AND INTERPRETATION: ULTRASOUND GUIDANCE: Images demonstrate the FNA and biopsy needles within the right infraclavicular lymph node.     CONCLUSION: 1.  Ultrasound-guided fine-needle aspiration of right infraclavicular lymph node. 2.  Ultrasound-guided percutaneous biopsy of right infraclavicular lymph node.    Us Thoracentesis    Result Date: 10/25/2017  1. RIGHT THORACENTESIS 2. ULTRASOUND GUIDANCE 10/25/2017 11:55 AM INDICATION: Pleural effusion. PROCEDURE: Informed consent obtained. Time out performed. The chest was prepped and draped in sterile fashion. 10 mL of 1 % lidocaine was infused into the local soft tissues. Under direct ultrasound guidance, a 5 [place] Yueh catheter system was placed into the pleural effusion. 1 liters of serosanguineous yellow fluid were removed and sent to lab, if requested. Patient tolerated procedure well. RADIOLOGIC SUPERVISION AND INTERPRETATION: ULTRASOUND GUIDANCE: Images demonstrate the pleural effusion. Catheter seen in good position.     CONCLUSION: Status post right ultrasound-guided thoracentesis.    Us Thoracentesis    Result Date: 10/16/2017  1. RIGHT THORACENTESIS 2. ULTRASOUND GUIDANCE 10/16/2017 9:16 AM INDICATION: Pleural effusion. PROCEDURE: Informed consent obtained. Time out performed. The chest was prepped and draped in sterile fashion. 10 mL of 1 % lidocaine was infused into the local soft tissues. Under direct ultrasound guidance, a 5 [place] Yueh catheter system was placed into the pleural effusion. 0.8 liters of slightly blood tinged thin fluid were removed and sent to lab, if requested. Patient tolerated procedure well. RADIOLOGIC SUPERVISION AND INTERPRETATION: ULTRASOUND GUIDANCE: Images demonstrate the pleural effusion. Catheter seen in good position.     CONCLUSION: Status post right ultrasound-guided thoracentesis.        Signed by: Marjorie Santiago  MD

## 2021-06-14 NOTE — PROGRESS NOTES
[facility]      NAME:  Susu Pinedo             :  1925    MRN: [id number]    CODE STATUS:  DNR    FACILITY: [facility] [[id number]]         CHIEF COMPLAIN/REASON FOR VISIT:  Chief Complaint   Patient presents with     Review Of Multiple Medical Conditions       HISTORY OF PRESENT ILLNESS: Susu Pinedo is a 89 y.o. female being seen today for review of medical symptoms.  Susu has recently been diagnosed with   Adenocarcinoma of lung, stage 4, right sided.  This was discovered after a workup when she went into [facility] with an increased shortness of breath.  She had been living at a senior living apartment and was quite ambulatory with a walker.  Past medical history also includes: Aortic Regurgitation, Chronic obstructive pulmonary disease, unspecified COPD type   Gastroesophageal reflux disease without esophagitis   Essential hypertension, Chronic diastolic heart failure  and HTN. She is seen in her room with oxygen in place sitting in a chair. Does get SOB with conversations. She is a bit disappointed today as she had planned on d/cing to another SNF, she has not been accepted as of yet. She also has an appt. With a surgeon related to any potential lung CA procedure as she is not a chemo candidate.          Allergies   Allergen Reactions     Amlodipine Besylate Hives     Hydrochlorothiazide      Annotation: caused low sodium       Other Allergy (See Comments) Hives     Contrast Media Ready-Box MISC, 04/10/2012.       Penicillins Hives   :     Current Outpatient Prescriptions   Medication Sig     traZODone (DESYREL) 50 MG tablet Take 50 mg by mouth at bedtime as needed for sleep.     albuterol (PROAIR HFA) 90 mcg/actuation inhaler Inhale 1-2 puffs every 4 (four) hours as needed. Every 4-6 hours     aspirin 325 MG EC tablet Take 325 mg by mouth every 7 days.      calcium carbonate (OS-NICKIE) 600 mg (1,500 mg) tablet Take 600 mg by mouth 2 (two) times a day with  meals.     cholecalciferol, vitamin D3, 1,000 unit tablet Take 2,000 Units by mouth 2 (two) times a day.      cloNIDine HCl (CATAPRES) 0.2 MG tablet Take 0.2 mg by mouth daily.     diltiazem (CARTIA XT) 240 MG 24 hr capsule Take 240 mg by mouth every evening.      fluticasone-salmeterol (ADVAIR) 500-50 mcg/dose DISKUS Inhale 1 puff 2 (two) times a day.     furosemide (LASIX) 20 MG tablet Take 20 mg by mouth daily.     ibuprofen (ADVIL,MOTRIN) 200 MG tablet Take 200 mg by mouth every 6 (six) hours as needed for pain.     ipratropium-albuterol (DUO-NEB) 0.5-2.5 mg/3 mL nebulizer Take 3 mL by nebulization every 6 (six) hours as needed.     lidocaine 4 % patch Remove and discard patch with 12 hours or as directed by MD.     losartan (COZAAR) 100 MG tablet Take 100 mg by mouth every evening.     magnesium oxide (MAG-OX) 400 mg tablet Take 400 mg by mouth daily.     melatonin 3 mg Tab tablet Take 6 mg by mouth at bedtime as needed.      multivitamin (MULTIVITAMIN) per tablet Take 1 tablet by mouth daily.     nebulizer and compressor Blanca duonebs     VIT A/VIT C/VIT E/ZINC/COPPER (ICAPS AREDS ORAL) Take 2 capsules by mouth daily.          REVIEW OF SYSTEMS:    Currently, no fever, chills, or rigors. Does not have any visual or hearing problems. Denies any chest pain, headaches, palpitations, lightheadedness, dizziness, increase in  shortness of breath, or cough. Appetite is good. Denies any GERD symptoms. Denies any difficulty with swallowing, nausea, or vomiting.  Denies any abdominal pain, diarrhea, occasional constipation. Denies any urinary symptoms. No insomnia. No active bleeding. No rash.       PHYSICAL EXAMINATION:  Vitals:    11/13/17 1651   BP: 166/72   Pulse: 97   Temp: 98.7  F (37.1  C)   Weight: 106 lb 11.2 oz (48.4 kg)         GENERAL: Awake, Alert, oriented x3, not in any form of acute distress, answers questions appropriately, follows simple commands, conversant  HEENT: Head is normocephalic with normal  hair distribution. No evidence of trauma. Ears: No acute purulent discharge. Eyes: Conjunctivae pink with no scleral jaundice. Nose: Normal mucosa and septum. NECK: Supple with no cervical or supraclavicular lymphadenopathy. Trachea is midline.   CHEST: No tenderness or deformity, no crepitus  LUNG: DM to auscultation  On R with good chest expansion. There are right sided  crackles throughout, normal AP diameter.  BACK: No kyphosis of the thoracic spine. Symmetric, no curvature, ROM normal, no CVA tenderness, no spinal tenderness   CVS: There is good S1  S2, there are no murmurs, rubs, gallops, or heaves, rhythm is regular.  ABDOMEN: Globular and soft, nontender to palpation, non distended, no masses, no organomegaly, good bowel sounds, no rebound or guarding, no peritoneal signs.   EXTREMITIES: Atraumatic. Full range of motion on both upper and lower extremities, there is no tenderness to palpation, right sided pedal edema, no cyanosis or clubbing, no calf tenderness, normal cap refill, no joint swelling.  SKIN: Warm and dry, no erythema noted, no rashes, new skin tear to lower left leg.  NEUROLOGICAL: The patient is oriented to person, place and time. Strength and sensation are grossly intact. Face is symmetric.                    LABS:    Lab Results   Component Value Date    WBC 9.6 10/30/2017    HGB 11.7 (L) 10/30/2017    HCT 34.9 (L) 10/30/2017    MCV 98 10/30/2017     10/30/2017       Results for orders placed or performed during the hospital encounter of 10/24/17   Basic Metabolic Panel   Result Value Ref Range    Sodium 133 (L) 136 - 145 mmol/L    Potassium 4.6 3.5 - 5.0 mmol/L    Chloride 98 98 - 107 mmol/L    CO2 25 22 - 31 mmol/L    Anion Gap, Calculation 10 5 - 18 mmol/L    Glucose 148 (H) 70 - 125 mg/dL    Calcium 8.5 8.5 - 10.5 mg/dL    BUN 14 8 - 28 mg/dL    Creatinine 0.67 0.60 - 1.10 mg/dL    GFR MDRD Af Amer >60 >60 mL/min/1.73m2    GFR MDRD Non Af Amer >60 >60 mL/min/1.73m2           No  results found for: HGBA1C  Vitamin D, Total (25-Hydroxy)   Date Value Ref Range Status   08/12/2016 29.6 (L) 30.0 - 80.0 ng/mL Final     Lab Results   Component Value Date    [id number] 906 04/01/2011       ASSESSMENT/PLAN:  1. Insomnia    2. Essential hypertension    3. CHF (congestive heart failure)    4. Adenocarcinoma of lung, stage 4, right      Plan of care will be ongoing with patient due to her hypoxia and oxygen needs. Her 02 sats remain stable on 02, her BS are dminnished, she reports no increase then normal of SOB. We will encourage her to use IS every 4 while awake, and she will remain on oxygen.  Due to request for sleeping poor do well I have asked the staff to a 7 day sleep log and I will increase her melatonin to 6 mg p.o. nightly as needed. Still sleeping poorly even with the increase, we will ass trazodone 50 mg po Q HS prn.  Per granddaughter who is her only family member, a palliative consult was made.  Unsure at this time if a palliative care will make rounds in nursing homes however the plan will be to follow-up with palliative care. She does have an upcoming apt with her surgeon this Wednesday.  Did also discuss current updates with pts granddaughter today. She lives in [place] and has some concerns as she is so far away, she has been updated and all has been reviewed.      Electronically signed by:  Sarika Hoang CNP  This progress note was completed using Dragon software and there may be grammatical errors.      40  minutes spent of which greater than 75 % was face to face communication with the patient about above plan of care

## 2021-06-14 NOTE — PROGRESS NOTES
Consultation - [facility]  Susu Pinedo,  1925, MRN [id number]    PCP: Raúl Espitia MD, [phone]    Assessment and Plan: Hx MAT, HTN, Lung CA with recurrent pleural effusions.  AI mod, MR mod  ECG today shows normal sinus rhythm with frequent atrial ectopy  Recommendations: Echo, Nuclear stress  Rule out pericardial effuasion  Assess for IHD in evenyt of surgery given age and risk profiel      Chief Complaint:  CHOI    HPI:  We have been requested by Dr Samayoa to evaluate Susu Pinedo for consultation who is a  89 y.o. year old female for preoperative cardiac risk.   Hx: Pt with Lung CA involves right mainstem bronchi and under evaluation for surgical resection.  Notes chronic dyspnea.  Hospitalized in October with dyspnea and found to have a significant pleural effusion.  Underwent thoracentesis successfully but it reaccumulated early and had a repeat thoracentesis performed.  Eventually early November was released from the hospital to TCU for rehab.  She has had chronic dyspnea since her release.  She had a chest x-ray earlier this morning that shows a small right pleural effusion.  No chest pain pressure heaviness history of myocardial infarction angina or coronary disease.  Has had history of palpitations and some irregular heartbeats with a diagnosis of multifocal atrial tachycardia.  I could not see previous confirmation of atrial fibrillation.  She has been on diltiazem therapy long-term for blood pressure control.  No history of syncope.  No history of heart failure    Medical History  Active Ambulatory (Non-Hospital) Problems    Diagnosis     Insomnia     Adenocarcinoma of lung, stage 4, right     Hyponatremia     Malignant pleural effusion     Hypoxia     Chronic diastolic heart failure     Lymphedema     Essential hypertension     CHF (congestive heart failure)     Acute respiratory failure with hypoxemia     Gastroesophageal reflux disease without esophagitis     Osteoporosis  Senile     Aortic Regurgitation     Diastolic Dysfunction     Hyperlipidemia     Mitral Regurgitation     Chronic obstructive pulmonary disease, unspecified COPD type     Past Medical History:   Diagnosis Date     Acute kidney injury 3/25/2016     Aortic regurgitation      Asthma      Breast cancer 1971     COPD (chronic obstructive pulmonary disease)      Diastolic CHF      GERD (gastroesophageal reflux disease)      Heart murmur      Hyperlipidemia      Hypertension      Hyponatremia      Lumbar stenosis      Mitral regurgitation      Osteoarthritis      Osteoporosis      Pulmonary hypertension        Surgical History  She  has a past surgical history that includes pr excision submaxillary gland; Oophorectomy; Mastectomy, radical (Bilateral, 1971); and Vertebroplasty (07/11/2014).    Social History  Reviewed, and she  reports that she quit smoking about 52 years ago. She has never used smokeless tobacco. She reports that she drinks about 0.6 oz of alcohol per week  She reports that she does not use illicit drugs.  Smoking status reviewed.  Social history othrwise not contributory to HPI.  Allergies  Allergies   Allergen Reactions     Amlodipine Besylate Hives     Hydrochlorothiazide      Annotation: caused low sodium       Other Allergy (See Comments) Hives     Contrast Media Ready-Box MISC, 04/10/2012.       Penicillins Hives       Family History  Reviewed, and family history includes Alzheimer's disease in her brother; Hypertension in her sister; No Medical Problems in her mother; Stroke in her father; Ulcers in her father.  Extended Emergency Contact Information  Primary Emergency Contact: Carlita Freire   [place]  Mobile Phone: [phone]  Relation: Grandchild  Secondary Emergency Contact: Javeir Freire   [place]  Mobile Phone: [phone]  Relation: Child  Family history otherwise negative or not conributory to HPI.    Psychosocial Needs  Social History     Social  "History Narrative    She is  twice.  Has 9 grandchildren.  None of them live in [place].  She lives in a senior living facility in [place] and is quite independent.  Granddaughter Carlita is very much involved.  She lives in [place].     Additional psychosocial needs reviewed per nursing assessment.    Prior to Admission Medications    (Not in a hospital admission)    Review of Systems:  Pertinent items are noted in HPI.  Review of systems is negative except for HPI  Physical Exam:  Less than 10 mL of urine    /78 (Patient Site: Left Arm, Patient Position: Sitting, Cuff Size: Adult Small)  Pulse 81  Resp 16  Ht 5' 2\" (1.575 m)  Wt 101 lb (45.8 kg)  BMI 18.47 kg/m2  General appearance: alert, appears stated age and cooperative  .  Mild edema noted in lower extremities on occasion.  Elderly very frail appearing woman with oxygen in place.  JVD.  Carotid upstrokes normal.  No bruit.  Head neck without focal defect.  Anicteric.  No lymphadenopathy or thyromegaly.  Breath sounds are markedly diminished and diminished and absent at the right base.  S1-S2 is irregular with a soft 2/6 murmur systolic the right lower sternal border no diastolic murmur was heard no S3-S4.  Epic S but tones normal.  Skin mild bruising noted otherwise warm.  Chronic degenerative joint disease.  Kyphosis.    Pertinent Labs  Lab Results: personally reviewed.   Lab Results   Component Value Date     (L) 11/14/2017    K 4.4 11/14/2017    CL 93 (L) 11/14/2017    CO2 28 11/14/2017    BUN 18 11/14/2017    CREATININE 0.60 11/14/2017    CALCIUM 8.6 11/14/2017     Lab Results   Component Value Date    TROPONINI 0.01 10/25/2017     Lab Results   Component Value Date    WBC 9.6 10/30/2017    WBC 10.1 04/08/2015    HGB 11.7 (L) 10/30/2017    HCT 34.9 (L) 10/30/2017    MCV 98 10/30/2017     10/30/2017     Lab Results   Component Value Date    LDLDIRECT 102.0 01/10/2012       Pertinent Radiology  Radiology Results: See " Report  EKG Results: See Report

## 2021-06-16 PROBLEM — I89.0 LYMPHEDEMA: Status: ACTIVE | Noted: 2017-01-01

## 2021-06-16 PROBLEM — G47.00 INSOMNIA: Status: ACTIVE | Noted: 2017-01-01

## 2021-06-16 PROBLEM — E87.1 HYPONATREMIA: Status: ACTIVE | Noted: 2017-01-01

## 2021-06-16 PROBLEM — C34.91 ADENOCARCINOMA OF LUNG, STAGE 4, RIGHT (H): Status: ACTIVE | Noted: 2017-01-01

## 2021-06-16 PROBLEM — J91.0 MALIGNANT PLEURAL EFFUSION (H): Status: ACTIVE | Noted: 2017-01-01

## 2021-06-16 PROBLEM — I10 ESSENTIAL HYPERTENSION: Status: ACTIVE | Noted: 2017-01-01

## 2021-06-16 PROBLEM — R06.09 DYSPNEA ON EXERTION: Status: ACTIVE | Noted: 2017-01-01

## 2021-06-16 PROBLEM — R09.02 HYPOXIA: Status: ACTIVE | Noted: 2017-01-01

## 2021-06-25 NOTE — PROGRESS NOTES
Progress Notes by Pee Aguilera CNP at 9/1/2017  9:00 AM     Author: Pee Aguilera CNP Service: -- Author Type: Nurse Practitioner    Filed: 9/1/2017  9:35 AM Encounter Date: 9/1/2017 Status: Attested    : Pee Aguilera CNP (Nurse Practitioner) Cosigner: Mati Alfaro MD at 9/1/2017 10:58 AM    Attestation signed by Mati Alfaro MD at 9/1/2017 10:58 AM    I have seen and examined the patient with MANUEL Aguilera and I agree with the assessment and plan as noted above.    Mati Alfaro MD                  Clinic Note    Assessment:     Assessment and Plan:  1. Arm edema    -Her arm looks better today, but she has evidence of swelling above her elbow where the arm wrap ends. I gave her an ace wrap today, and instructed her to start at her wrist, and wrap her arm up past her elbow. She told me that her daughter is coming to visit her early next week, and will be able to help her with this. We will have her stop taking her aspirin BID, and have her go back to once weekly like she was before. See instructions below.      Patient Instructions   -Keep the arm wrapped as much as you can, but take it off twice a day to apply warm compresses to the arm/elbow.    -Stop taking two aspirin per day; go back to taking one per week.     -Continue to keep the arm elevated to help the swelling drain away.    -Let us know if it is not better in two week, or, if it starts to get worse; we can make a referral to the vascular clinic for a second opinion.                Subjective:      Susu Pinedo is a 89 y.o. female who is here for follow-up from her arm swelling that she was seen for 08/28/2017. Her US showed that she did not have a DVT, but rather, had a cephalic vein thrombus. She was told to take an extra 325 mg of asa, keep the arm elevated, use warm compresses, and follow-up at the end of the week.     Today, she says that in the morning, her arm looks normal, but as the day wears on, and if she is  not able to keep it elevated, the swelling comes back. She continues to have no pain in the elbow or arm, and has never had a reduction in ROM or strength. No chest pain, cough, or difficulty breathing. No reported neuro symptoms.     She comes to the exam today with her arm wrapped in gauze.     The following portions of the patient's history were reviewed and updated as appropriate: Allergies, medications, and problem list.     Review of Systems:    Review is negative except for what is mentioned above.     Social Hx:    History   Smoking Status   ? Former Smoker   ? Quit date: 7/11/1965   Smokeless Tobacco   ? Not on file         Objective:     Vitals:    09/01/17 0855   BP: 140/70   Pulse: 66   Weight: 107 lb (48.5 kg)       Exam:    General: No apparent distress. Calm. Alert and Oriented X3. Pt behavior is appropriate.  Chest/Lungs: Normal chest wall, clear to auscultation, normal respiratory effort and rate.   Heart/Pulses: Regular rate and rhythm, strong and equal radial pulses, no murmurs. Capillary refill <2 seconds.    Abdomen: Soft, no palpable masses. No hepatosplenomegaly, no tenderness with palpation noted. Bowel sounds active in all quadrants. No increased tympany.   Genitalia: Not examined.   Musculoskeletal: Right arm has +3 pitting edema around her elbow joint where her wrap abruptly ended. Otherwise, no evidence of swelling on her forearm where her arm was wrapped.   Neurologic: Interactive, alert, no focal findings, CNs intact.   Skin: Warm, dry. Normal hair pattern. Free of lesions. Normal skin turgor.       Patient Active Problem List   Diagnosis   ? Aortic Regurgitation   ? Diastolic Dysfunction   ? Hyperlipidemia   ? Mitral Regurgitation   ? Emphysema   ? Hypertension   ? Osteoporosis Senile   ? GERD (gastroesophageal reflux disease)   ? CHF (congestive heart failure)     Current Outpatient Prescriptions   Medication Sig Dispense Refill   ? acetaminophen (TYLENOL) 325 MG tablet Take 650 mg by  mouth every 6 (six) hours as needed for pain.     ? ADVAIR DISKUS 500-50 mcg/dose DISKUS USE 1 INHALATION TWICE A  each 3   ? albuterol (PROAIR HFA) 90 mcg/actuation inhaler Inhale 1-2 puffs every 4 (four) hours as needed. Every 4-6 hours 1 Inhaler 0   ? aspirin 325 MG EC tablet Take 325 mg by mouth daily as needed.      ? bisacodyl (DULCOLAX) 10 mg suppository Insert 10 mg into the rectum daily as needed.     ? calcium carbonate (OS-NICKIE) 600 mg (1,500 mg) tablet Take 600 mg by mouth 2 (two) times a day with meals.     ? CARTIA  mg 24 hr capsule TAKE 1 CAPSULE DAILY 90 capsule 1   ? cholecalciferol, vitamin D3, 1,000 unit tablet Take 2,000 Units by mouth daily.     ? cloNIDine HCl (CATAPRES) 0.2 MG tablet TAKE 1 TABLET DAILY 90 tablet 2   ? diltiazem (CARTIA XT) 240 MG 24 hr capsule Take 240 mg by mouth every evening.      ? doxycycline (MONODOX) 100 MG capsule Take 1 capsule (100 mg total) by mouth 2 (two) times a day. 20 capsule 0   ? furosemide (LASIX) 20 MG tablet TAKE 1 TABLET DAILY 90 tablet 3   ? ibuprofen (ADVIL,MOTRIN) 200 MG tablet Take 200 mg by mouth every 6 (six) hours as needed for pain.     ? ipratropium-albuterol (DUO-NEB) 0.5-2.5 mg/3 mL nebulizer Take 3 mL by nebulization every 6 (six) hours as needed. 25 vial 2   ? losartan (COZAAR) 100 MG tablet TAKE 1 TABLET EVERY EVENING 90 tablet 2   ? magnesium oxide (MAGOX) 400 mg tablet Take 1 tablet (400 mg total) by mouth daily. 200 tablet 1   ? melatonin 3 mg Tab tablet Take 3 mg by mouth bedtime as needed.     ? multivitamin (MULTIVITAMIN) per tablet Take 1 tablet by mouth daily.     ? nebulizer and compressor Blanca duonebs 1 each 0   ? omeprazole (PRILOSEC) 20 MG capsule Take 1 capsule (20 mg total) by mouth daily. 90 capsule 1   ? senna-docusate (PERICOLACE) 8.6-50 mg tablet Take 1 tablet by mouth bedtime as needed for constipation.     ? VIT A/VIT C/VIT E/ZINC/COPPER (ICAPS AREDS ORAL) Take 2 capsules by mouth daily.        No current  facility-administered medications for this visit.          Pee Aguilera (Rob), GLADIS    9/1/2017

## 2021-07-03 NOTE — ADDENDUM NOTE
Addendum Note by Bloch, Lisa M, CMA at 2/24/2017  4:51 PM     Author: Bloch, Lisa M, CMA Service: -- Author Type: Certified Medical Assistant    Filed: 2/24/2017  4:51 PM Encounter Date: 2/24/2017 Status: Signed    : Bloch, Lisa M, CMA (Certified Medical Assistant)    Addended by: BLOCH, LISA M on: 2/24/2017 04:51 PM        Modules accepted: Orders